# Patient Record
Sex: FEMALE | Race: WHITE | NOT HISPANIC OR LATINO | URBAN - METROPOLITAN AREA
[De-identification: names, ages, dates, MRNs, and addresses within clinical notes are randomized per-mention and may not be internally consistent; named-entity substitution may affect disease eponyms.]

---

## 2018-06-21 ENCOUNTER — EMERGENCY (EMERGENCY)
Facility: HOSPITAL | Age: 33
LOS: 1 days | Discharge: HOME | End: 2018-06-21
Attending: EMERGENCY MEDICINE | Admitting: EMERGENCY MEDICINE
Payer: MEDICAID

## 2018-06-21 VITALS — DIASTOLIC BLOOD PRESSURE: 87 MMHG | SYSTOLIC BLOOD PRESSURE: 125 MMHG | HEART RATE: 80 BPM

## 2018-06-21 VITALS
TEMPERATURE: 98 F | OXYGEN SATURATION: 99 % | RESPIRATION RATE: 17 BRPM | HEART RATE: 102 BPM | SYSTOLIC BLOOD PRESSURE: 131 MMHG | DIASTOLIC BLOOD PRESSURE: 95 MMHG

## 2018-06-21 LAB
ALBUMIN SERPL ELPH-MCNC: 3 G/DL — LOW (ref 3.4–5)
ALP SERPL-CCNC: 73 U/L — SIGNIFICANT CHANGE UP (ref 40–120)
ALT FLD-CCNC: 14 U/L — SIGNIFICANT CHANGE UP (ref 12–42)
ANION GAP SERPL CALC-SCNC: 10 MMOL/L — SIGNIFICANT CHANGE UP (ref 9–16)
APPEARANCE UR: CLEAR — SIGNIFICANT CHANGE UP
APTT BLD: 25.1 SEC — LOW (ref 27.5–36.5)
AST SERPL-CCNC: 15 U/L — SIGNIFICANT CHANGE UP (ref 15–37)
BILIRUB SERPL-MCNC: 0.3 MG/DL — SIGNIFICANT CHANGE UP (ref 0.2–1.2)
BILIRUB UR-MCNC: NEGATIVE — SIGNIFICANT CHANGE UP
BUN SERPL-MCNC: 10 MG/DL — SIGNIFICANT CHANGE UP (ref 7–23)
CALCIUM SERPL-MCNC: 9 MG/DL — SIGNIFICANT CHANGE UP (ref 8.5–10.5)
CHLORIDE SERPL-SCNC: 103 MMOL/L — SIGNIFICANT CHANGE UP (ref 96–108)
CK MB BLD-MCNC: <0.75 % — SIGNIFICANT CHANGE UP
CK MB CFR SERPL CALC: <0.5 NG/ML — LOW (ref 0.5–3.6)
CK SERPL-CCNC: 67 U/L — SIGNIFICANT CHANGE UP (ref 26–192)
CO2 SERPL-SCNC: 24 MMOL/L — SIGNIFICANT CHANGE UP (ref 22–31)
COLOR SPEC: YELLOW — SIGNIFICANT CHANGE UP
CREAT SERPL-MCNC: 0.82 MG/DL — SIGNIFICANT CHANGE UP (ref 0.5–1.3)
D DIMER BLD IA.RAPID-MCNC: 185 NG/ML DDU — SIGNIFICANT CHANGE UP
DIFF PNL FLD: NEGATIVE — SIGNIFICANT CHANGE UP
GLUCOSE SERPL-MCNC: 99 MG/DL — SIGNIFICANT CHANGE UP (ref 70–99)
GLUCOSE UR QL: NEGATIVE — SIGNIFICANT CHANGE UP
HCG UR QL: NEGATIVE — SIGNIFICANT CHANGE UP
HCT VFR BLD CALC: 38 % — SIGNIFICANT CHANGE UP (ref 34.5–45)
HGB BLD-MCNC: 12.3 G/DL — SIGNIFICANT CHANGE UP (ref 11.5–15.5)
INR BLD: 0.89 — SIGNIFICANT CHANGE UP (ref 0.88–1.16)
KETONES UR-MCNC: NEGATIVE — SIGNIFICANT CHANGE UP
LEUKOCYTE ESTERASE UR-ACNC: NEGATIVE — SIGNIFICANT CHANGE UP
MCHC RBC-ENTMCNC: 27.2 PG — SIGNIFICANT CHANGE UP (ref 27–34)
MCHC RBC-ENTMCNC: 32.4 G/DL — SIGNIFICANT CHANGE UP (ref 32–36)
MCV RBC AUTO: 83.9 FL — SIGNIFICANT CHANGE UP (ref 80–100)
NITRITE UR-MCNC: NEGATIVE — SIGNIFICANT CHANGE UP
NT-PROBNP SERPL-SCNC: 14 PG/ML — SIGNIFICANT CHANGE UP
PH UR: 6 — SIGNIFICANT CHANGE UP (ref 5–8)
PLATELET # BLD AUTO: 405 K/UL — HIGH (ref 150–400)
POTASSIUM SERPL-MCNC: 4.4 MMOL/L — SIGNIFICANT CHANGE UP (ref 3.5–5.3)
POTASSIUM SERPL-SCNC: 4.4 MMOL/L — SIGNIFICANT CHANGE UP (ref 3.5–5.3)
PROT SERPL-MCNC: 7.9 G/DL — SIGNIFICANT CHANGE UP (ref 6.4–8.2)
PROT UR-MCNC: NEGATIVE MG/DL — SIGNIFICANT CHANGE UP
PROTHROM AB SERPL-ACNC: 9.8 SEC — SIGNIFICANT CHANGE UP (ref 9.8–12.7)
RBC # BLD: 4.53 M/UL — SIGNIFICANT CHANGE UP (ref 3.8–5.2)
RBC # FLD: 12.2 % — SIGNIFICANT CHANGE UP (ref 10.3–16.9)
SODIUM SERPL-SCNC: 137 MMOL/L — SIGNIFICANT CHANGE UP (ref 132–145)
SP GR SPEC: <=1.005 — SIGNIFICANT CHANGE UP (ref 1–1.03)
TROPONIN I SERPL-MCNC: <0.017 NG/ML — LOW (ref 0.02–0.06)
UROBILINOGEN FLD QL: 0.2 E.U./DL — SIGNIFICANT CHANGE UP
WBC # BLD: 10 K/UL — SIGNIFICANT CHANGE UP (ref 3.8–10.5)
WBC # FLD AUTO: 10 K/UL — SIGNIFICANT CHANGE UP (ref 3.8–10.5)

## 2018-06-21 PROCEDURE — 71275 CT ANGIOGRAPHY CHEST: CPT | Mod: 26

## 2018-06-21 PROCEDURE — 70450 CT HEAD/BRAIN W/O DYE: CPT | Mod: 26

## 2018-06-21 PROCEDURE — 93010 ELECTROCARDIOGRAM REPORT: CPT

## 2018-06-21 PROCEDURE — 99284 EMERGENCY DEPT VISIT MOD MDM: CPT | Mod: 25

## 2018-06-21 RX ORDER — SODIUM CHLORIDE 9 MG/ML
1000 INJECTION INTRAMUSCULAR; INTRAVENOUS; SUBCUTANEOUS ONCE
Qty: 0 | Refills: 0 | Status: COMPLETED | OUTPATIENT
Start: 2018-06-21 | End: 2018-06-21

## 2018-06-21 RX ADMIN — SODIUM CHLORIDE 1000 MILLILITER(S): 9 INJECTION INTRAMUSCULAR; INTRAVENOUS; SUBCUTANEOUS at 14:03

## 2018-06-21 NOTE — ED PROVIDER NOTE - OBJECTIVE STATEMENT
34 yo F with no PMHx presents s/p syncope episode while on airplane 8 hours ago. Pt states does not usually suffer from altitude sickness, but went on a trip going from [place] to [place]. Denies fever, chills, or other complaints.

## 2018-06-21 NOTE — ED PROVIDER NOTE - MEDICAL DECISION MAKING DETAILS
Pt presents s/p syncope episode likely due to altitude sickness. Will conduct labs, CT angio and head, and EKG and reassess.

## 2018-06-21 NOTE — ED ADULT NURSE NOTE - OBJECTIVE STATEMENT
Patient to ED s/p syncopal episode while on flight when plane dropped quickly.  Patient in n oacute distress

## 2018-06-25 DIAGNOSIS — R55 SYNCOPE AND COLLAPSE: ICD-10-CM

## 2018-06-25 DIAGNOSIS — Z88.1 ALLERGY STATUS TO OTHER ANTIBIOTIC AGENTS STATUS: ICD-10-CM

## 2018-06-28 ENCOUNTER — APPOINTMENT (OUTPATIENT)
Dept: FAMILY MEDICINE | Facility: CLINIC | Age: 33
End: 2018-06-28
Payer: MEDICAID

## 2018-06-28 VITALS
OXYGEN SATURATION: 97 % | WEIGHT: 163 LBS | DIASTOLIC BLOOD PRESSURE: 79 MMHG | HEART RATE: 82 BPM | TEMPERATURE: 97.9 F | BODY MASS INDEX: 27.16 KG/M2 | SYSTOLIC BLOOD PRESSURE: 115 MMHG | HEIGHT: 65 IN

## 2018-06-28 DIAGNOSIS — Z13.29 ENCOUNTER FOR SCREENING FOR OTHER SUSPECTED ENDOCRINE DISORDER: ICD-10-CM

## 2018-06-28 DIAGNOSIS — Z13.1 ENCOUNTER FOR SCREENING FOR DIABETES MELLITUS: ICD-10-CM

## 2018-06-28 DIAGNOSIS — Z84.2 FAMILY HISTORY OF OTHER DISEASES OF THE GENITOURINARY SYSTEM: ICD-10-CM

## 2018-06-28 DIAGNOSIS — H93.8X9 OTHER SPECIFIED DISORDERS OF EAR, UNSPECIFIED EAR: ICD-10-CM

## 2018-06-28 DIAGNOSIS — Z13.21 ENCOUNTER FOR SCREENING FOR NUTRITIONAL DISORDER: ICD-10-CM

## 2018-06-28 DIAGNOSIS — Z82.49 FAMILY HISTORY OF ISCHEMIC HEART DISEASE AND OTHER DISEASES OF THE CIRCULATORY SYSTEM: ICD-10-CM

## 2018-06-28 PROCEDURE — 99385 PREV VISIT NEW AGE 18-39: CPT | Mod: 25

## 2018-06-28 PROCEDURE — 99214 OFFICE O/P EST MOD 30 MIN: CPT | Mod: 25

## 2018-06-28 PROCEDURE — 36415 COLL VENOUS BLD VENIPUNCTURE: CPT

## 2018-06-28 NOTE — PHYSICAL EXAM
[No Acute Distress] : no acute distress [Well Nourished] : well nourished [Well Developed] : well developed [Well-Appearing] : well-appearing [Normal Sclera/Conjunctiva] : normal sclera/conjunctiva [PERRL] : pupils equal round and reactive to light [EOMI] : extraocular movements intact [Normal Outer Ear/Nose] : the outer ears and nose were normal in appearance [Normal Oropharynx] : the oropharynx was normal [No JVD] : no jugular venous distention [Supple] : supple [No Lymphadenopathy] : no lymphadenopathy [Thyroid Normal, No Nodules] : the thyroid was normal and there were no nodules present [No Respiratory Distress] : no respiratory distress  [Clear to Auscultation] : lungs were clear to auscultation bilaterally [No Accessory Muscle Use] : no accessory muscle use [Normal Rate] : normal rate  [Regular Rhythm] : with a regular rhythm [Normal S1, S2] : normal S1 and S2 [No Murmur] : no murmur heard [No Abdominal Bruit] : a ~M bruit was not heard ~T in the abdomen [No Varicosities] : no varicosities [No Edema] : there was no peripheral edema [No Extremity Clubbing/Cyanosis] : no extremity clubbing/cyanosis [No Palpable Aorta] : no palpable aorta [Soft] : abdomen soft [Non Tender] : non-tender [Non-distended] : non-distended [No Masses] : no abdominal mass palpated [No HSM] : no HSM [Normal Bowel Sounds] : normal bowel sounds [Normal Posterior Cervical Nodes] : no posterior cervical lymphadenopathy [Normal Anterior Cervical Nodes] : no anterior cervical lymphadenopathy [No CVA Tenderness] : no CVA  tenderness [No Spinal Tenderness] : no spinal tenderness [No Joint Swelling] : no joint swelling [Grossly Normal Strength/Tone] : grossly normal strength/tone [No Rash] : no rash [Normal Gait] : normal gait [Coordination Grossly Intact] : coordination grossly intact [No Focal Deficits] : no focal deficits [Normal Affect] : the affect was normal [Normal Insight/Judgement] : insight and judgment were intact [Deep Tendon Reflexes (DTR)] : deep tendon reflexes were 2+ and symmetric [Cranial Nerves Oculomotor (III)] : the extraocular motions were intact [Cranial Nerves Trigeminal (V)] : sensation to the face and masseter strength were intact [Cranial Nerves Facial (VII)] : facial strength was intact bilaterally [Cranial Nerves Vestibulocochlear (VIII)] : hearing was intact [Cranial Nerves Hypoglossal (XII)] : there was no tongue deviation with protrusion [Normal] : coordination was normal [Limited Balance] : balance was intact [Romberg's Sign] : Romberg's sign was negtive [2+] : left 2+ [Alert and Oriented x3] : oriented to person, place, and time [de-identified] : fluid behind tms

## 2018-06-29 LAB
25(OH)D3 SERPL-MCNC: 39.6 NG/ML
ALBUMIN SERPL ELPH-MCNC: 3.7 G/DL
ALP BLD-CCNC: 59 U/L
ALT SERPL-CCNC: 4 U/L
ANION GAP SERPL CALC-SCNC: 17 MMOL/L
APPEARANCE: CLEAR
AST SERPL-CCNC: 17 U/L
BACTERIA: ABNORMAL
BASOPHILS # BLD AUTO: 0.05 K/UL
BASOPHILS NFR BLD AUTO: 0.6 %
BILIRUB SERPL-MCNC: 0.3 MG/DL
BILIRUBIN URINE: NEGATIVE
BLOOD URINE: NEGATIVE
BUN SERPL-MCNC: 12 MG/DL
CALCIUM SERPL-MCNC: 9 MG/DL
CHLORIDE SERPL-SCNC: 100 MMOL/L
CHOLEST SERPL-MCNC: 234 MG/DL
CHOLEST/HDLC SERPL: 3 RATIO
CO2 SERPL-SCNC: 19 MMOL/L
COLOR: YELLOW
CREAT SERPL-MCNC: 0.79 MG/DL
EOSINOPHIL # BLD AUTO: 0.18 K/UL
EOSINOPHIL NFR BLD AUTO: 2.1 %
FOLATE SERPL-MCNC: 14.7 NG/ML
GLUCOSE QUALITATIVE U: NEGATIVE MG/DL
GLUCOSE SERPL-MCNC: 86 MG/DL
HBA1C MFR BLD HPLC: 5.6 %
HCT VFR BLD CALC: 38.6 %
HDLC SERPL-MCNC: 78 MG/DL
HGB BLD-MCNC: 11.5 G/DL
HYALINE CASTS: 0 /LPF
IMM GRANULOCYTES NFR BLD AUTO: 0 %
KETONES URINE: NEGATIVE
LDLC SERPL CALC-MCNC: 113 MG/DL
LEUKOCYTE ESTERASE URINE: NEGATIVE
LYMPHOCYTES # BLD AUTO: 3.18 K/UL
LYMPHOCYTES NFR BLD AUTO: 36.8 %
MAN DIFF?: NORMAL
MCHC RBC-ENTMCNC: 26.9 PG
MCHC RBC-ENTMCNC: 29.8 GM/DL
MCV RBC AUTO: 90.4 FL
MICROSCOPIC-UA: NORMAL
MONOCYTES # BLD AUTO: 0.39 K/UL
MONOCYTES NFR BLD AUTO: 4.5 %
NEUTROPHILS # BLD AUTO: 4.84 K/UL
NEUTROPHILS NFR BLD AUTO: 56 %
NITRITE URINE: NEGATIVE
PH URINE: 6.5
PLATELET # BLD AUTO: 412 K/UL
POTASSIUM SERPL-SCNC: 4.2 MMOL/L
PROT SERPL-MCNC: 7.5 G/DL
PROTEIN URINE: NEGATIVE MG/DL
RBC # BLD: 4.27 M/UL
RBC # FLD: 14 %
RED BLOOD CELLS URINE: 0 /HPF
SODIUM SERPL-SCNC: 136 MMOL/L
SPECIFIC GRAVITY URINE: 1
SQUAMOUS EPITHELIAL CELLS: 1 /HPF
T4 FREE SERPL-MCNC: 1.3 NG/DL
TRIGL SERPL-MCNC: 214 MG/DL
TSH SERPL-ACNC: 1.31 UIU/ML
UROBILINOGEN URINE: NEGATIVE MG/DL
VIT B12 SERPL-MCNC: 475 PG/ML
WBC # FLD AUTO: 8.64 K/UL
WHITE BLOOD CELLS URINE: 1 /HPF

## 2019-03-08 ENCOUNTER — APPOINTMENT (OUTPATIENT)
Dept: FAMILY MEDICINE | Facility: CLINIC | Age: 34
End: 2019-03-08
Payer: MEDICAID

## 2019-03-08 ENCOUNTER — LABORATORY RESULT (OUTPATIENT)
Age: 34
End: 2019-03-08

## 2019-03-08 VITALS
HEIGHT: 65 IN | HEART RATE: 95 BPM | SYSTOLIC BLOOD PRESSURE: 116 MMHG | OXYGEN SATURATION: 98 % | DIASTOLIC BLOOD PRESSURE: 81 MMHG | TEMPERATURE: 98.7 F | WEIGHT: 171 LBS | BODY MASS INDEX: 28.49 KG/M2

## 2019-03-08 PROCEDURE — 36415 COLL VENOUS BLD VENIPUNCTURE: CPT

## 2019-03-08 PROCEDURE — 99214 OFFICE O/P EST MOD 30 MIN: CPT | Mod: 25

## 2019-03-08 NOTE — PHYSICAL EXAM
[No Acute Distress] : no acute distress [Well Nourished] : well nourished [Normal Sclera/Conjunctiva] : normal sclera/conjunctiva [PERRL] : pupils equal round and reactive to light [EOMI] : extraocular movements intact [Normal Outer Ear/Nose] : the outer ears and nose were normal in appearance [Supple] : supple [No Respiratory Distress] : no respiratory distress  [Clear to Auscultation] : lungs were clear to auscultation bilaterally [Normal Rate] : normal rate  [Regular Rhythm] : with a regular rhythm [No Edema] : there was no peripheral edema [No CVA Tenderness] : no CVA  tenderness [No Spinal Tenderness] : no spinal tenderness [No Joint Swelling] : no joint swelling [Grossly Normal Strength/Tone] : grossly normal strength/tone [No Rash] : no rash [Normal Gait] : normal gait [Coordination Grossly Intact] : coordination grossly intact [No Focal Deficits] : no focal deficits [Deep Tendon Reflexes (DTR)] : deep tendon reflexes were 2+ and symmetric [Speech Grossly Normal] : speech grossly normal [Memory Grossly Normal] : memory grossly normal [Normal Affect] : the affect was normal [Alert and Oriented x3] : oriented to person, place, and time [Normal Insight/Judgement] : insight and judgment were intact

## 2019-03-08 NOTE — REVIEW OF SYSTEMS
[Fatigue] : fatigue [Nausea] : nausea [Dizziness] : dizziness [Unsteady Walking] : ataxia [Negative] : Heme/Lymph [de-identified] : syncope

## 2019-03-08 NOTE — HISTORY OF PRESENT ILLNESS
[FreeTextEntry8] : 33 yo female here for follow up. Back in June had episode where she was on a plane and she felt nauseous and sick. She got cold and sweaty and her knees went numb. She had tunnel vision and passed out for a few seconds. She went to the ED for this after landing in [place]. They did EKG, labs, D dimer, CT brain, and CTA and all were negative.  Symptoms started back in June. Had 4 similar episodes since then with dizziness. The initial episode resulted in her passing out, but no passing out since then. Feeling better today, but still struggling with feeling unstable at times when walking. Still with a complaint of nausea at times, extreme fatigue, and feeling like her legs are very heavy. Symptoms occur generally when she is very stressed out. Feels like she's dragging her feet. Also admitting to random joint pain. Last episode was last week before a stressful deadline and her shoulder was hurting her a lot at that time. No longer with shoulder pain.\par \par Admitting to daily anxiety. Seeing a psychoanalyst, just saw him this tuesday. Hadn't seen him in a while before.

## 2019-03-13 ENCOUNTER — MOBILE ON CALL (OUTPATIENT)
Age: 34
End: 2019-03-13

## 2019-03-13 LAB
25(OH)D3 SERPL-MCNC: 33.8 NG/ML
ALBUMIN SERPL ELPH-MCNC: 4.1 G/DL
ALP BLD-CCNC: 70 U/L
ALT SERPL-CCNC: 9 U/L
ANA SER IF-ACNC: NEGATIVE
ANION GAP SERPL CALC-SCNC: 17 MMOL/L
AST SERPL-CCNC: 12 U/L
B BURGDOR IGG+IGM SER QL IB: NORMAL
BASOPHILS # BLD AUTO: 0.05 K/UL
BASOPHILS NFR BLD AUTO: 0.6 %
BILIRUB SERPL-MCNC: <0.2 MG/DL
BUN SERPL-MCNC: 14 MG/DL
CALCIUM SERPL-MCNC: 9.5 MG/DL
CCP AB SER IA-ACNC: 10 UNITS
CHLORIDE SERPL-SCNC: 104 MMOL/L
CO2 SERPL-SCNC: 19 MMOL/L
CREAT SERPL-MCNC: 0.74 MG/DL
CRP SERPL-MCNC: 1.1 MG/DL
EOSINOPHIL # BLD AUTO: 0.12 K/UL
EOSINOPHIL NFR BLD AUTO: 1.3 %
ERYTHROCYTE [SEDIMENTATION RATE] IN BLOOD BY WESTERGREN METHOD: 34 MM/HR
FOLATE SERPL-MCNC: 19.1 NG/ML
GLUCOSE SERPL-MCNC: 100 MG/DL
HBA1C MFR BLD HPLC: 5.5 %
HCG SERPL-MCNC: <1 MIU/ML
HCT VFR BLD CALC: 38.2 %
HGB BLD-MCNC: 11.7 G/DL
IMM GRANULOCYTES NFR BLD AUTO: 0.1 %
LYMPHOCYTES # BLD AUTO: 4.15 K/UL
LYMPHOCYTES NFR BLD AUTO: 46.7 %
MAN DIFF?: NORMAL
MCHC RBC-ENTMCNC: 27.9 PG
MCHC RBC-ENTMCNC: 30.6 GM/DL
MCV RBC AUTO: 91 FL
MONOCYTES # BLD AUTO: 0.49 K/UL
MONOCYTES NFR BLD AUTO: 5.5 %
NEUTROPHILS # BLD AUTO: 4.07 K/UL
NEUTROPHILS NFR BLD AUTO: 45.8 %
PLATELET # BLD AUTO: 436 K/UL
POTASSIUM SERPL-SCNC: 4.9 MMOL/L
PROT SERPL-MCNC: 7.4 G/DL
RBC # BLD: 4.2 M/UL
RBC # FLD: 13.1 %
RF+CCP IGG SER-IMP: NEGATIVE
RHEUMATOID FACT SER QL: <10 IU/ML
SODIUM SERPL-SCNC: 140 MMOL/L
T4 FREE SERPL-MCNC: 1.3 NG/DL
TSH SERPL-ACNC: 0.87 UIU/ML
VIT B12 SERPL-MCNC: 553 PG/ML
WBC # FLD AUTO: 8.89 K/UL

## 2019-03-14 LAB

## 2019-03-18 ENCOUNTER — TRANSCRIPTION ENCOUNTER (OUTPATIENT)
Age: 34
End: 2019-03-18

## 2019-04-01 ENCOUNTER — APPOINTMENT (OUTPATIENT)
Dept: RHEUMATOLOGY | Facility: CLINIC | Age: 34
End: 2019-04-01
Payer: MEDICAID

## 2019-04-01 VITALS
DIASTOLIC BLOOD PRESSURE: 78 MMHG | HEIGHT: 65 IN | WEIGHT: 171 LBS | SYSTOLIC BLOOD PRESSURE: 113 MMHG | BODY MASS INDEX: 28.49 KG/M2 | HEART RATE: 83 BPM | TEMPERATURE: 98.5 F | OXYGEN SATURATION: 98 %

## 2019-04-01 DIAGNOSIS — Z82.49 FAMILY HISTORY OF ISCHEMIC HEART DISEASE AND OTHER DISEASES OF THE CIRCULATORY SYSTEM: ICD-10-CM

## 2019-04-01 DIAGNOSIS — Z80.9 FAMILY HISTORY OF MALIGNANT NEOPLASM, UNSPECIFIED: ICD-10-CM

## 2019-04-01 DIAGNOSIS — R53.1 WEAKNESS: ICD-10-CM

## 2019-04-01 DIAGNOSIS — Z84.2 FAMILY HISTORY OF OTHER DISEASES OF THE GENITOURINARY SYSTEM: ICD-10-CM

## 2019-04-01 DIAGNOSIS — R59.0 LOCALIZED ENLARGED LYMPH NODES: ICD-10-CM

## 2019-04-01 PROCEDURE — 81025 URINE PREGNANCY TEST: CPT

## 2019-04-01 PROCEDURE — 36415 COLL VENOUS BLD VENIPUNCTURE: CPT

## 2019-04-01 PROCEDURE — 99205 OFFICE O/P NEW HI 60 MIN: CPT | Mod: 25

## 2019-04-01 RX ORDER — FLUTICASONE PROPIONATE 50 UG/1
50 SPRAY, METERED NASAL TWICE DAILY
Qty: 1 | Refills: 0 | Status: COMPLETED | COMMUNITY
Start: 2018-06-28 | End: 2019-04-01

## 2019-04-01 NOTE — ASSESSMENT
[FreeTextEntry1] : 35 yo female was referred by PMD Dr. Garcia for Rheum evaluation, pt has anxiety, fatigue and dizziness, symptoms started back  in June. \par Most recently she feel depressed, has insomnia and itchiness, also  reports arthralgia, puffiness of her hands, dry eyes without dry mouth. \par Exam is noted for left shoulder tenderness on palpation laterally , right tender wrist and overall puffy hands with no synovitis of any joints on exam. \par small R submandibular and left inguinal LAD.\par Labs noted lymphocytosis and thrombocytosis. Rheum work up has been negative, except mildly elevated ESR and CRP. \par Pending Neurology and Hematology consultations. \par \par Differential diagnosis of her presentation is most likely related to depression and she need a formal psychiatric evaluation which I discussed with pt. \par Will r/u Sjogren's disease given above symptoms + LAD on exam, however negative SWAPNIL argues against Sjogren's: Check SSa/Ssb. Puffy hands mostly due to fat accumulation for recent weight gain. \par repeat ESR and CRP. \par Check CK, aldolase: not high  suspicious for inflammatory myositis given not typical distribution of weakness \par For b/l hand arthralgia and puffiness: will obtain b/l hand and wrist X-rays. ( office urine pregnancy test negative)  \par Will check SPEP/UPEP with immunofixation and encouraged pt to make Hem appointment as she has LAD, itchiness along with lymphocytosis  and lymphoproliferative disease need to be rule out.   \par Check for Hep B, C, HIV and Tb quantiferon. \par WIll see her in 2 weeks for follow up labs and images.

## 2019-04-01 NOTE — REVIEW OF SYSTEMS
[Feeling Tired] : feeling tired [Recent Weight Gain (___ Lbs)] : recent [unfilled] ~Ulb weight gain [Dry Eyes] : dryness of the eyes [Arthralgias] : arthralgias [Joint Pain] : joint pain [Joint Swelling] : joint swelling [Itching] : itching [Dizziness] : dizziness [Limb Weakness] : limb weakness [Sleep Disturbances] : sleep disturbances [Anxiety] : anxiety [Depression] : depression [Swollen Glands] : swollen glands [Fever] : no fever [Feeling Poorly] : not feeling poorly [Eye Pain] : no eye pain [Red Eyes] : eyes not red [Eyesight Problems] : no eyesight problems [Discharge From Eyes] : no purulent discharge from the eyes [Eyes Itch] : no itching of the eyes [Nosebleeds] : no nosebleeds [Heart Rate Is Slow] : the heart rate was not slow [Heart Rate Is Fast] : the heart rate was not fast [Chest Pain] : no chest pain [Palpitations] : no palpitations [Leg Claudication] : no intermittent leg claudication [Lower Ext Edema] : no lower extremity edema [Shortness Of Breath] : no shortness of breath [Wheezing] : no wheezing [Cough] : no cough [SOB on Exertion] : no shortness of breath during exertion [Orthopnea] : no orthopnea [Abdominal Pain] : no abdominal pain [Vomiting] : no vomiting [Constipation] : no constipation [Diarrhea] : no diarrhea [Heartburn] : no heartburn [Melena] : no melena [Dysuria] : no dysuria [Pelvic Pain] : no pelvic pain [Vaginal Discharge] : no vaginal discharge [Joint Stiffness] : no joint stiffness [Limb Pain] : no limb pain [Limb Swelling] : no limb swelling [Skin Lesions] : no skin lesions [Skin Wound] : no skin wound [Confused] : no confusion [Convulsions] : no convulsions [Difficulty Walking] : no difficulty walking [Suicidal] : not suicidal [Change In Personality] : no personality change [Emotional Problems] : no emotional problems [Proptosis] : no proptosis [Hot Flashes] : no hot flashes [Muscle Weakness] : no muscle weakness [Easy Bleeding] : no tendency for easy bleeding [Easy Bruising] : no tendency for easy bruising [Swollen Glands In The Neck] : no swollen glands in the neck [FreeTextEntry4] : left ear ringing occasionally  [FreeTextEntry7] : nausea  [de-identified] : hopeless thoughts.  [de-identified] : left groin

## 2019-04-01 NOTE — PHYSICAL EXAM
[General Appearance - Alert] : alert [General Appearance - In No Acute Distress] : in no acute distress [General Appearance - Well Nourished] : well nourished [General Appearance - Well Developed] : well developed [Sclera] : the sclera and conjunctiva were normal [Optic Disc Abnormality] : the optic disc were normal in size and color [Hearing Threshold Finger Rub Not [place]] : hearing was normal [Examination Of The Oral Cavity] : the lips and gums were normal [Oropharynx] : the oropharynx was normal [Neck Appearance] : the appearance of the neck was normal [Thyroid Diffuse Enlargement] : the thyroid was not enlarged [Thyroid Nodule] : there were no palpable thyroid nodules [Auscultation Breath Sounds / Voice Sounds] : lungs were clear to auscultation bilaterally [Heart Rate And Rhythm] : heart rate was normal and rhythm regular [Heart Sounds] : normal S1 and S2 [Heart Sounds Gallop] : no gallops [Murmurs] : no murmurs [Heart Sounds Pericardial Friction Rub] : no pericardial rub [Arterial Pulses Femoral] : femoral pulses were normal without bruits [Edema] : there was no peripheral edema [Veins - Varicosity Changes] : there were no varicosital changes [Abdomen Soft] : soft [Abdomen Tenderness] : non-tender [Abdomen Mass (___ Cm)] : no abdominal mass palpated [Inguinal Lymph Nodes Enlarged Bilaterally] : inguinal [Abnormal Walk] : normal gait [Nail Clubbing] : no clubbing  or cyanosis of the fingernails [Musculoskeletal - Swelling] : no joint swelling seen [Motor Tone] : muscle strength and tone were normal [Skin Color & Pigmentation] : normal skin color and pigmentation [] : no rash [Skin Lesions] : no skin lesions [Sensation] : the sensory exam was normal to light touch and pinprick [Motor Exam] : the motor exam was normal [Oriented To Time, Place, And Person] : oriented to person, place, and time [FreeTextEntry1] : After exam, pt noted few areas of hyperpigmented spots on both knees. not raised, non tender

## 2019-04-01 NOTE — HISTORY OF PRESENT ILLNESS
[Fatigue] : fatigue [Depression] : depression [Arthralgias] : arthralgias [Joint Swelling] : joint swelling [Difficulty Standing] : difficulty standing [Difficulty Walking] : difficulty walking [Dry Eyes] : dry eyes [FreeTextEntry1] : 33 yo female was referred by PMD Dr. Garcia for Rheum evaluation, pt has anxiety, fatigue and dizziness, symptoms started back  in June was on a plane and she felt nauseous and sick, felt  cold and sweaty and her knees went numb. She had tunnel vision and passed out for a few seconds. She went to the ED for this after landing in [place], work up has been negative. Had recurrent 4 similar episodes since then with dizziness,  no passing out since then.\par ROS: +  extreme fatigue, arthralgia and puffiness of her hands without dry skin, Raynaud;s disease or rash, but do reports full body itchiness recently. Can't sleep well. \par Dry eyes but no dry mouth, able to produce tears. \par She feels depressed since before a stressful deadline and she could not focus or function at all. \par Admitting to daily anxiety. Seeing a psychoanalyst weekly, but not seen psychiatrist. \par 20Ibs weight gain for 1 year.  \par Nausea \par Denies alopecia or malar rash.  \par Last week she is experiencing left sided should pain, thinks due to sleeping in wrong position\par R knee snapping sounds and sometimes ankles , knees swells up if kneels \par Has herniated L spine disc L1-L3 but denies chronic low back pain. \par Left groin small mobile Lymph node. 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 \par Denies personal or family hx of autoimmune disease. \par She had extensive labs done recently which showed mildly elevated ESR and CRP, negative RF.CCP, SWAPNIL. \par CBC with lymphocytosis and thrombocytosis: pending Hem consult.  [Anorexia] : no anorexia [Weight Loss] : no weight loss [Malaise] : no malaise [Fever] : no fever [Chills] : no chills [Malar Facial Rash] : no malar facial rash [Skin Lesions] : no lesions [Skin Nodules] : no skin nodules [Oral Ulcers] : no oral ulcers [Cough] : no cough [Dry Mouth] : no dry mouth [Dysphonia] : no dysphonia [Dysphagia] : no dysphagia [Joint Warmth] : no joint warmth [Joint Deformity] : no joint deformity [Decreased ROM] : no decreased range of motion [Morning Stiffness] : no morning stiffness [Falls] : no falls [Muscle Weakness] : no muscle weakness [Muscle Spasms] : no muscle spasms [Muscle Cramping] : no muscle cramping [Visual Changes] : no visual changes [Eye Pain] : no eye pain [Eye Redness] : no eye redness

## 2019-04-02 ENCOUNTER — LABORATORY RESULT (OUTPATIENT)
Age: 34
End: 2019-04-02

## 2019-04-02 ENCOUNTER — OUTPATIENT (OUTPATIENT)
Dept: OUTPATIENT SERVICES | Facility: HOSPITAL | Age: 34
LOS: 1 days | End: 2019-04-02

## 2019-04-02 ENCOUNTER — APPOINTMENT (OUTPATIENT)
Dept: RADIOLOGY | Facility: CLINIC | Age: 34
End: 2019-04-02
Payer: MEDICAID

## 2019-04-02 PROCEDURE — 73110 X-RAY EXAM OF WRIST: CPT | Mod: 26,50

## 2019-04-02 PROCEDURE — 73130 X-RAY EXAM OF HAND: CPT | Mod: 26,50

## 2019-04-03 LAB
CK SERPL-CCNC: 68 U/L
CRP SERPL-MCNC: 1.25 MG/DL
ERYTHROCYTE [SEDIMENTATION RATE] IN BLOOD BY WESTERGREN METHOD: 42 MM/HR
HBV CORE IGM SER QL: NONREACTIVE
HBV SURFACE AG SER QL: NONREACTIVE
HCV AB SER QL: NONREACTIVE
HCV S/CO RATIO: 0.11 S/CO
HIV1+2 AB SPEC QL IA.RAPID: NONREACTIVE

## 2019-04-04 LAB
ALBUMIN MFR SERPL ELPH: 52.2 %
ALBUMIN SERPL-MCNC: 3.7 G/DL
ALBUMIN/GLOB SERPL: 1.1 RATIO
ALDOLASE SERPL-CCNC: 2.2 U/L
ALPHA1 GLOB MFR SERPL ELPH: 5.2 %
ALPHA1 GLOB SERPL ELPH-MCNC: 0.4 G/DL
ALPHA2 GLOB MFR SERPL ELPH: 11.1 %
ALPHA2 GLOB SERPL ELPH-MCNC: 0.8 G/DL
B-GLOBULIN MFR SERPL ELPH: 14.5 %
B-GLOBULIN SERPL ELPH-MCNC: 1 G/DL
ENA SS-A AB SER IA-ACNC: <0.2 AL
ENA SS-B AB SER IA-ACNC: <0.2 AL
GAMMA GLOB FLD ELPH-MCNC: 1.2 G/DL
GAMMA GLOB MFR SERPL ELPH: 17 %
HBV SURFACE AB SER QL: NONREACTIVE
INTERPRETATION SERPL IEP-IMP: NORMAL
M PROTEIN SPEC IFE-MCNC: NORMAL
PROT SERPL-MCNC: 7.1 G/DL
PROT SERPL-MCNC: 7.1 G/DL

## 2019-04-06 LAB
ALBUPE: 7.8 %
ALPHA1UPE: 26.4 %
ALPHA2UPE: 14.8 %
BETAUPE: 7.9 %
GAMMAUPE: 43.1 %
IGA 24H UR QL IFE: NORMAL
KAPPA LC 24H UR QL: NORMAL
M TB IFN-G BLD-IMP: NEGATIVE
PROT PATTERN 24H UR ELPH-IMP: NORMAL
PROT UR-MCNC: <4 MG/DL
PROT UR-MCNC: <4 MG/DL
QUANTIFERON TB PLUS MITOGEN MINUS NIL: 1.65 IU/ML
QUANTIFERON TB PLUS NIL: 0.02 IU/ML
QUANTIFERON TB PLUS TB1 MINUS NIL: 0 IU/ML
QUANTIFERON TB PLUS TB2 MINUS NIL: 0 IU/ML

## 2019-04-18 ENCOUNTER — APPOINTMENT (OUTPATIENT)
Dept: HEMATOLOGY ONCOLOGY | Facility: CLINIC | Age: 34
End: 2019-04-18
Payer: MEDICAID

## 2019-04-18 VITALS
TEMPERATURE: 98.4 F | HEART RATE: 78 BPM | OXYGEN SATURATION: 98 % | DIASTOLIC BLOOD PRESSURE: 83 MMHG | HEIGHT: 66 IN | SYSTOLIC BLOOD PRESSURE: 117 MMHG | RESPIRATION RATE: 14 BRPM | BODY MASS INDEX: 27.97 KG/M2 | WEIGHT: 174 LBS

## 2019-04-18 DIAGNOSIS — D47.3 ESSENTIAL (HEMORRHAGIC) THROMBOCYTHEMIA: ICD-10-CM

## 2019-04-18 PROCEDURE — 99204 OFFICE O/P NEW MOD 45 MIN: CPT | Mod: 25

## 2019-04-18 PROCEDURE — 36415 COLL VENOUS BLD VENIPUNCTURE: CPT

## 2019-04-19 ENCOUNTER — LABORATORY RESULT (OUTPATIENT)
Age: 34
End: 2019-04-19

## 2019-04-23 ENCOUNTER — TRANSCRIPTION ENCOUNTER (OUTPATIENT)
Age: 34
End: 2019-04-23

## 2019-04-23 ENCOUNTER — APPOINTMENT (OUTPATIENT)
Dept: RHEUMATOLOGY | Facility: CLINIC | Age: 34
End: 2019-04-23
Payer: MEDICAID

## 2019-04-23 VITALS
BODY MASS INDEX: 27.97 KG/M2 | HEIGHT: 66 IN | SYSTOLIC BLOOD PRESSURE: 121 MMHG | OXYGEN SATURATION: 96 % | TEMPERATURE: 99.3 F | HEART RATE: 73 BPM | WEIGHT: 174 LBS | DIASTOLIC BLOOD PRESSURE: 83 MMHG

## 2019-04-23 DIAGNOSIS — Z86.2 PERSONAL HISTORY OF DISEASES OF THE BLOOD AND BLOOD-FORMING ORGANS AND CERTAIN DISORDERS INVOLVING THE IMMUNE MECHANISM: ICD-10-CM

## 2019-04-23 LAB
BASOPHILS NFR BLD AUTO: 0.4 %
EOSINOPHIL NFR BLD AUTO: 1.4 %
HCT VFR BLD CALC: 38.9 %
HGB BLD-MCNC: 12.6 G/DL
LYMPHOCYTES NFR BLD AUTO: 43.3 %
MAN DIFF?: NORMAL
MCHC RBC-ENTMCNC: 28.4 PG
MCHC RBC-ENTMCNC: 32.4 GM/DL
MCV RBC AUTO: 87.6 FL
MONOCYTES NFR BLD AUTO: 6.1 %
NEUTROPHILS NFR BLD AUTO: 48.5 %
PLATELET # BLD AUTO: 387 K/UL
RBC # BLD: 4.44 M/UL
RBC # FLD: 12.1 %
WBC # FLD AUTO: 9.39 K/UL

## 2019-04-23 PROCEDURE — 99214 OFFICE O/P EST MOD 30 MIN: CPT | Mod: 25

## 2019-04-23 PROCEDURE — 36415 COLL VENOUS BLD VENIPUNCTURE: CPT

## 2019-04-23 RX ORDER — PSYLLIUM HUSK 0.4 G
CAPSULE ORAL
Refills: 0 | Status: DISCONTINUED | COMMUNITY
End: 2019-04-23

## 2019-04-23 NOTE — HISTORY OF PRESENT ILLNESS
[de-identified] : 34 years old female who admits she is under tremendous amount of stress was found to have elevated platelet count... of note the platelet count is minimally elevated.... workup so far was all negative... patient has been seeing a psychiatrist for the last 2-1/2 years...

## 2019-04-23 NOTE — ASSESSMENT
[FreeTextEntry1] : 35 yo female was referred by PMD Dr. Garcia for Rheum evaluation, pt has anxiety, fatigue and dizziness, symptoms started back  in June, 2018\par Most recently she feel depressed, has insomnia and itchiness, also  reports arthralgia, puffiness of her hands, dry eyes without dry mouth. \par Exam is noted for left shoulder tenderness on palpation laterally , right tender wrist and overall puffy hands with no synovitis of any joints on exam. \par small R submandibular and left inguinal LAD.\par Labs noted lymphocytosis and thrombocytosis. Rheum work up has been negative, except moderately elevated ESR and CRP. \par \par \par Differential diagnosis of her presentation is most likely related to depression and she need a formal psychiatric evaluation which I discussed with pt again today. \par Will r/u Sjogren's disease given above symptoms + LAD on exam, however negative SWAPNIL argues against Sjogren's: Check SSa/Ssb. Puffy hands mostly due to fat accumulation for recent weight gain. \par repeat ESR and CRP  moderately elevated \par Normal CK, aldolase\par negative SPEP/UPEP with IF. Had hem consultation: manual diff no lymphocytosis  no thrombocytosis. \par \par For submandibular LAD: will obtain neck US and refer pt to ENT ( to see PAIGE Marrero After US) as may need LN excisional biopsy based on US finding. \par differential diagnoses of LAD  reactive vs inflammatory ( Sjogrens vs sarcoidosis) vs lymphoproliferative. \par Pt has few pulmonary solid nodules found incidentally on CT performed 06/2017, she is due for f/u CT in May/June and will ask Dr. Garcia to order it. \par Check ACE level and ANCA \par Has intermittent urticaria and pruritus: will refer for Allergy/Immunology evaluation. \par \par f/u in 3 weeks

## 2019-04-23 NOTE — CONSULT LETTER
[Dear  ___] : Dear  [unfilled], [Consult Letter:] : I had the pleasure of evaluating your patient, [unfilled]. [Please see my note below.] : Please see my note below. [Consult Closing:] : Thank you very much for allowing me to participate in the care of this patient.  If you have any questions, please do not hesitate to contact me. [Sincerely,] : Sincerely, [FreeTextEntry3] : Florencia Pollard MD\par

## 2019-04-23 NOTE — ASSESSMENT
[FreeTextEntry1] : Repeat blood work Platelet count is within normal limits...\par \par Patient is hematologically stable followup here p.r.n.

## 2019-04-23 NOTE — HISTORY OF PRESENT ILLNESS
[Fatigue] : fatigue [Depression] : depression [Arthralgias] : arthralgias [Joint Swelling] : joint swelling [Difficulty Standing] : difficulty standing [Difficulty Walking] : difficulty walking [Dry Eyes] : dry eyes [FreeTextEntry1] : Follow up: 4/23/19\par Pt saw Hem: manual diff normal, repeat CBC no thrombocytosis\par No new complaints, labs negative, except moderatly elevated ESR and CRP. \par Still with pruritus and intermittent urticaria, takes zyrtec to control symptoms. \par Denies fever, chills or cough.  \par Has not seen psychiatrist yet, very stressed out about many things, argues with BF. \par \par \par Initial consult: 4/1/19\par 35 yo female was referred by PMD Dr. Garcia for Rheum evaluation, pt has anxiety, fatigue and dizziness, symptoms started back  in June was on a plane and she felt nauseous and sick, felt  cold and sweaty and her knees went numb. She had tunnel vision and passed out for a few seconds. She went to the ED for this after landing in [place], work up has been negative. Had recurrent 4 similar episodes since then with dizziness,  no passing out since then.\par ROS: +  extreme fatigue, arthralgia and puffiness of her hands without dry skin, Raynaud;s disease or rash, but do reports full body itchiness recently. Can't sleep well. \par Dry eyes but no dry mouth, able to produce tears. \par She feels depressed since before a stressful deadline and she could not focus or function at all. \par Admitting to daily anxiety. Seeing a psychoanalyst weekly, but not seen psychiatrist. \par 20Ibs weight gain for 1 year.  \par Nausea \par Denies alopecia or malar rash.  \par Last week she is experiencing left sided should pain, thinks due to sleeping in wrong position\par R knee snapping sounds and sometimes ankles , knees swells up if kneels \par Has herniated L spine disc L1-L3 but denies chronic low back pain. \par Left groin small mobile Lymph node. 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 \par Denies personal or family hx of autoimmune disease. \par She had extensive labs done recently which showed mildly elevated ESR and CRP, negative RF.CCP, SWAPNIL. \par CBC with lymphocytosis and thrombocytosis: pending Hem consult.  [Anorexia] : no anorexia [Weight Loss] : no weight loss [Malaise] : no malaise [Fever] : no fever [Chills] : no chills [Malar Facial Rash] : no malar facial rash [Skin Lesions] : no lesions [Skin Nodules] : no skin nodules [Oral Ulcers] : no oral ulcers [Cough] : no cough [Dry Mouth] : no dry mouth [Dysphonia] : no dysphonia [Dysphagia] : no dysphagia [Joint Warmth] : no joint warmth [Joint Deformity] : no joint deformity [Decreased ROM] : no decreased range of motion [Falls] : no falls [Morning Stiffness] : no morning stiffness [Muscle Weakness] : no muscle weakness [Muscle Spasms] : no muscle spasms [Muscle Cramping] : no muscle cramping [Eye Pain] : no eye pain [Visual Changes] : no visual changes [Eye Redness] : no eye redness

## 2019-04-23 NOTE — REVIEW OF SYSTEMS
[Feeling Tired] : feeling tired [Recent Weight Gain (___ Lbs)] : recent [unfilled] ~Ulb weight gain [Dry Eyes] : dryness of the eyes [Arthralgias] : arthralgias [Joint Pain] : joint pain [Joint Swelling] : joint swelling [Itching] : itching [Dizziness] : dizziness [Limb Weakness] : limb weakness [Sleep Disturbances] : sleep disturbances [Anxiety] : anxiety [Depression] : depression [Swollen Glands] : swollen glands [Fever] : no fever [Eye Pain] : no eye pain [Feeling Poorly] : not feeling poorly [Eyesight Problems] : no eyesight problems [Red Eyes] : eyes not red [Eyes Itch] : no itching of the eyes [Discharge From Eyes] : no purulent discharge from the eyes [Heart Rate Is Slow] : the heart rate was not slow [Nosebleeds] : no nosebleeds [Heart Rate Is Fast] : the heart rate was not fast [Palpitations] : no palpitations [Chest Pain] : no chest pain [Leg Claudication] : no intermittent leg claudication [Lower Ext Edema] : no lower extremity edema [Shortness Of Breath] : no shortness of breath [Wheezing] : no wheezing [Cough] : no cough [SOB on Exertion] : no shortness of breath during exertion [Vomiting] : no vomiting [Abdominal Pain] : no abdominal pain [Orthopnea] : no orthopnea [Constipation] : no constipation [Diarrhea] : no diarrhea [Heartburn] : no heartburn [Dysuria] : no dysuria [Melena] : no melena [Vaginal Discharge] : no vaginal discharge [Pelvic Pain] : no pelvic pain [Joint Stiffness] : no joint stiffness [Limb Pain] : no limb pain [Skin Lesions] : no skin lesions [Limb Swelling] : no limb swelling [Convulsions] : no convulsions [Confused] : no confusion [Skin Wound] : no skin wound [Difficulty Walking] : no difficulty walking [Suicidal] : not suicidal [Change In Personality] : no personality change [Emotional Problems] : no emotional problems [Proptosis] : no proptosis [Hot Flashes] : no hot flashes [Muscle Weakness] : no muscle weakness [Easy Bleeding] : no tendency for easy bleeding [Easy Bruising] : no tendency for easy bruising [Swollen Glands In The Neck] : no swollen glands in the neck [FreeTextEntry4] : left ear ringing occasionally  [FreeTextEntry7] : nausea  [de-identified] : hopeless thoughts.  [de-identified] : left groin

## 2019-04-23 NOTE — PHYSICAL EXAM
[General Appearance - Alert] : alert [General Appearance - In No Acute Distress] : in no acute distress [General Appearance - Well Developed] : well developed [General Appearance - Well Nourished] : well nourished [Sclera] : the sclera and conjunctiva were normal [Optic Disc Abnormality] : the optic disc were normal in size and color [Hearing Threshold Finger Rub Not [place]] : hearing was normal [Examination Of The Oral Cavity] : the lips and gums were normal [Oropharynx] : the oropharynx was normal [Neck Appearance] : the appearance of the neck was normal [Thyroid Diffuse Enlargement] : the thyroid was not enlarged [Thyroid Nodule] : there were no palpable thyroid nodules [Auscultation Breath Sounds / Voice Sounds] : lungs were clear to auscultation bilaterally [Heart Rate And Rhythm] : heart rate was normal and rhythm regular [Heart Sounds] : normal S1 and S2 [Heart Sounds Gallop] : no gallops [Murmurs] : no murmurs [Heart Sounds Pericardial Friction Rub] : no pericardial rub [Arterial Pulses Femoral] : femoral pulses were normal without bruits [Edema] : there was no peripheral edema [Veins - Varicosity Changes] : there were no varicosital changes [Abdomen Soft] : soft [Abdomen Tenderness] : non-tender [Abdomen Mass (___ Cm)] : no abdominal mass palpated [Inguinal Lymph Nodes Enlarged Bilaterally] : inguinal [Nail Clubbing] : no clubbing  or cyanosis of the fingernails [Abnormal Walk] : normal gait [Motor Tone] : muscle strength and tone were normal [Musculoskeletal - Swelling] : no joint swelling seen [Skin Color & Pigmentation] : normal skin color and pigmentation [] : no rash [Skin Lesions] : no skin lesions [Sensation] : the sensory exam was normal to light touch and pinprick [Motor Exam] : the motor exam was normal [Oriented To Time, Place, And Person] : oriented to person, place, and time [FreeTextEntry1] : After exam, pt noted few areas of hyperpigmented spots on both knees. not raised, non tender

## 2019-04-26 ENCOUNTER — MOBILE ON CALL (OUTPATIENT)
Age: 34
End: 2019-04-26

## 2019-05-01 LAB
MISCELLANEOUS TEST: NORMAL
PROC NAME: NORMAL

## 2019-05-17 ENCOUNTER — FORM ENCOUNTER (OUTPATIENT)
Age: 34
End: 2019-05-17

## 2019-05-18 ENCOUNTER — APPOINTMENT (OUTPATIENT)
Dept: ULTRASOUND IMAGING | Facility: CLINIC | Age: 34
End: 2019-05-18
Payer: MEDICAID

## 2019-05-18 ENCOUNTER — OUTPATIENT (OUTPATIENT)
Dept: OUTPATIENT SERVICES | Facility: HOSPITAL | Age: 34
LOS: 1 days | End: 2019-05-18

## 2019-05-18 ENCOUNTER — APPOINTMENT (OUTPATIENT)
Dept: CT IMAGING | Facility: CLINIC | Age: 34
End: 2019-05-18
Payer: MEDICAID

## 2019-05-18 PROCEDURE — 71250 CT THORAX DX C-: CPT | Mod: 26

## 2019-05-18 PROCEDURE — 76536 US EXAM OF HEAD AND NECK: CPT | Mod: 26

## 2019-05-20 ENCOUNTER — MOBILE ON CALL (OUTPATIENT)
Age: 34
End: 2019-05-20

## 2019-05-20 ENCOUNTER — TRANSCRIPTION ENCOUNTER (OUTPATIENT)
Age: 34
End: 2019-05-20

## 2019-05-21 LAB
ACE BLD-CCNC: 45 U/L
ANCA AB SER-IMP: NEGATIVE
C-ANCA SER-ACNC: NEGATIVE
CRP SERPL-MCNC: 1.46 MG/DL
ERYTHROCYTE [SEDIMENTATION RATE] IN BLOOD BY WESTERGREN METHOD: 54 MM/HR
MPO AB + PR3 PNL SER: NORMAL
P-ANCA TITR SER IF: NEGATIVE

## 2019-05-23 ENCOUNTER — APPOINTMENT (OUTPATIENT)
Dept: OTOLARYNGOLOGY | Facility: CLINIC | Age: 34
End: 2019-05-23
Payer: MEDICAID

## 2019-05-23 VITALS
TEMPERATURE: 98.4 F | OXYGEN SATURATION: 98 % | HEART RATE: 100 BPM | HEIGHT: 66 IN | BODY MASS INDEX: 27.97 KG/M2 | DIASTOLIC BLOOD PRESSURE: 72 MMHG | SYSTOLIC BLOOD PRESSURE: 119 MMHG | WEIGHT: 174 LBS

## 2019-05-23 DIAGNOSIS — Z78.9 OTHER SPECIFIED HEALTH STATUS: ICD-10-CM

## 2019-05-23 DIAGNOSIS — J35.1 HYPERTROPHY OF TONSILS: ICD-10-CM

## 2019-05-23 DIAGNOSIS — R59.1 GENERALIZED ENLARGED LYMPH NODES: ICD-10-CM

## 2019-05-23 DIAGNOSIS — R06.83 SNORING: ICD-10-CM

## 2019-05-23 DIAGNOSIS — Z81.1 FAMILY HISTORY OF ALCOHOL ABUSE AND DEPENDENCE: ICD-10-CM

## 2019-05-23 PROCEDURE — 99204 OFFICE O/P NEW MOD 45 MIN: CPT | Mod: 25

## 2019-05-23 PROCEDURE — 92550 TYMPANOMETRY & REFLEX THRESH: CPT

## 2019-05-23 PROCEDURE — 31575 DIAGNOSTIC LARYNGOSCOPY: CPT

## 2019-05-23 PROCEDURE — 92504 EAR MICROSCOPY EXAMINATION: CPT

## 2019-05-23 PROCEDURE — 99203 OFFICE O/P NEW LOW 30 MIN: CPT | Mod: 25

## 2019-05-23 PROCEDURE — 92557 COMPREHENSIVE HEARING TEST: CPT

## 2019-05-23 NOTE — PHYSICAL EXAM
[de-identified] : No LAD noted [Binocular Microscopic Exam] : Binocular microscopic exam was performed [Laryngoscopy Performed] : laryngoscopy was performed, see procedure section for findings [de-identified] : 3-4+ [Normal] : no rashes

## 2019-05-23 NOTE — CONSULT LETTER
[Dear  ___] : Dear  [unfilled], [Consult Letter:] : I had the pleasure of evaluating your patient, [unfilled]. [Please see my note below.] : Please see my note below. [Consult Closing:] : Thank you very much for allowing me to participate in the care of this patient.  If you have any questions, please do not hesitate to contact me. [Sincerely,] : Sincerely, [FreeTextEntry3] : ROBY Marrero Jr, MD, FAAOHNS\par Otolaryngologist\par [facility]

## 2019-05-23 NOTE — HISTORY OF PRESENT ILLNESS
[de-identified] : R sided lymphadenopathy found on an exam around 6 wks ago and found on sono to represent a large level 2 LN. Exhausted for the last year & undergoing an extensive rheumatologic workup which has so far been negative except for inflammatory markers. No recent URIs or other head & neck complaints, but she had a dental cleaning. Denies type B sxs. Complains of unexpected weight gain over several years. \par Also with L ear "fuzziness" kervin in loud areas; also nonpulsatile R>L tinnitus. \par Snoring but no witnessed apneas; not treated for HTN. No morning headaches but a lot of daytime somnolence.

## 2019-05-23 NOTE — ASSESSMENT
[FreeTextEntry1] : Somnolence with snoring and TH but no witnessed apneas: Discussed how to have a family member perform a home apnea screen; bring this information upon return along with cell phone video if unsure.\par LAD no longer palpable on exam without other concerns; in the absence of other issues, would follow this, but will d/w Dr. Tuttle regarding the need for a tissue dx. \par Rpt thyroid sono 1 yr. \par Reassured regarding her ears.

## 2019-05-23 NOTE — DATA REVIEWED
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 Condition: improving. Plan: Discussed diagnosis in detail with patient. Discussed treatment options with patient. Continue using current medication(s). Will continue to observe condition and or symptoms. [de-identified] : today: normal [de-identified] : 5/19: ESR 54, CRP 1.46; nl ANCAs, ACE, CBC, HIV [de-identified] : 5/19 neck: R thyroid lobe 1 cm nodule w/o susp features; 2.4 x 1.1 x 1.7cm R level 2 LN w/ a fatty hilum

## 2019-06-05 ENCOUNTER — APPOINTMENT (OUTPATIENT)
Dept: PULMONOLOGY | Facility: CLINIC | Age: 34
End: 2019-06-05
Payer: MEDICAID

## 2019-06-05 VITALS
TEMPERATURE: 98.5 F | HEART RATE: 97 BPM | SYSTOLIC BLOOD PRESSURE: 116 MMHG | BODY MASS INDEX: 27.97 KG/M2 | WEIGHT: 174 LBS | HEIGHT: 66 IN | OXYGEN SATURATION: 97 % | DIASTOLIC BLOOD PRESSURE: 72 MMHG

## 2019-06-05 PROCEDURE — 99204 OFFICE O/P NEW MOD 45 MIN: CPT

## 2019-06-05 NOTE — PHYSICAL EXAM
[General Appearance - Well Developed] : well developed [Normal Appearance] : normal appearance [General Appearance - Well Nourished] : well nourished [Normal Conjunctiva] : the conjunctiva exhibited no abnormalities [No Deformities] : no deformities [Erythema] : erythema of the pharynx [Neck Appearance] : the appearance of the neck was normal [Heart Rate And Rhythm] : heart rate and rhythm were normal [] : no respiratory distress [Heart Sounds] : normal S1 and S2 [Auscultation Breath Sounds / Voice Sounds] : lungs were clear to auscultation bilaterally [Respiration, Rhythm And Depth] : normal respiratory rhythm and effort [Exaggerated Use Of Accessory Muscles For Inspiration] : no accessory muscle use [Bowel Sounds] : normal bowel sounds [Abdomen Soft] : soft [Abnormal Walk] : normal gait [Nail Clubbing] : no clubbing of the fingernails [No Focal Deficits] : no focal deficits [Cyanosis, Localized] : no localized cyanosis [Impaired Insight] : insight and judgment were intact [Oriented To Time, Place, And Person] : oriented to person, place, and time [Affect] : the affect was normal [Mood] : the mood was normal

## 2019-06-05 NOTE — ASSESSMENT
[FreeTextEntry1] : 33 yo woman with hx of recurrent syncopal episodes and fatigue referred for evaluation of pulmonary nodules. Referred by Dr. Trey Tuttle. Recent CT chest reviewed: new LLL pleural based solid nodule of 3 mm, unchanged bilateral solid 4 mm nodules (CTA done in 2018); no hilar/mediastinal lymphadenopathy noted but this evaluation is limited without contrast. Ms. Sellers is a never smoker and is otherwise low risk and as per most recent Fleischer guidelines follow up is not indicated. CARY calculator predicts <1% chance of malignancy at 2 and 4 year follow up. Also, there is no reason to suspect that these nodules are in any way related to a possible rheumatologic disorder that is undergoing workup currently. \par \par Given fatigue, snoring, excessive daytime somnolence and weight gain Ms. Sellers is indicated for sleep apnea testing. We discussed both home and attended testing. Will proceed with an attended PSG. If insurance denies coverage HSAT testing is acceptable. She was referred to the [facility] for the PSG. The ramifications of ADDISON and its treatment were discussed in detail. \par \par Ms. Sellers will follow up in 4 weeks after the PSG is resulted.

## 2019-06-05 NOTE — HISTORY OF PRESENT ILLNESS
[Difficulty Breathing During Exertion] : denies dyspnea on exertion [Feelings Of Weakness On Exertion] : denies exercise intolerance [Cough] : denies coughing [Wheezing] : denies wheezing [Regional Soft Tissue Swelling Both Lower Extremities] : denies lower extremity edema [Chest Pain Or Discomfort] : denies chest pain [Fever] : denies fever [0  -  Nothing at all] : 0, nothing at all [Class I - No Symptoms and No Limitations] : I [Snoring] : snoring [Daytime Somnolence] : daytime somnolence [ESS: ___] : ESS score [unfilled] [Awakes Unrefreshed] : awakening unrefreshed [Recent  Weight Gain] : recent weight gain [Oxygen] : the patient uses no supplemental oxygen [DIS] : no DIS [Unusual Sleep Behavior] : no unusual sleep behavior [DMS] : no DMS [Hypersomnolence] : no hypersomnolence [Cataplexy] : no cataplexy [Sleep Paralysis] : no sleep paralysis [Hypnagogic Hallucinations] : no hallucinations when falling asleep [Hypnopompic Hallucinations] : no hallucinations when awakening [FreeTextEntry1] : 35 yo woman with hx of 4 fainting episodes in last 1 year presenting for evaluation of pulmonary nodules. Ms. Sellers had 4 syncopal episodes of unclear etiology. Endorses daytime fatigue and excessive daytime somnolence. Has gained weight. Rheum work up was initiated and she has an elevated ESR/CRP. CT chest done in May 2019 (as part of syncopal workup) showed pulmonary nodules. She denies all pulmonary and respiratory symptoms. Did have frequent bronchitis when she was younger. No cough. Never smoker. No occupational exposures. No known mold exposures. No cats.\par \par Of note, right cervical node >2cm was noted (along with complex thyroid cyst); followed by Dr. Marrero for further diagnosis. \par

## 2019-06-05 NOTE — CONSULT LETTER
[Dear  ___] : Dear  [unfilled], [Consult Letter:] : I had the pleasure of evaluating your patient, [unfilled]. [Please see my note below.] : Please see my note below. [Consult Closing:] : Thank you very much for allowing me to participate in the care of this patient.  If you have any questions, please do not hesitate to contact me. [FreeTextEntry3] : Annie Rodriguez MD\par \par [place] [employer] [facility]\par Pulmonary, Critical Care, and Sleep Medicine\par  [Sincerely,] : Sincerely,

## 2019-06-06 ENCOUNTER — OTHER (OUTPATIENT)
Age: 34
End: 2019-06-06

## 2019-06-11 ENCOUNTER — OTHER (OUTPATIENT)
Age: 34
End: 2019-06-11

## 2019-07-09 ENCOUNTER — APPOINTMENT (OUTPATIENT)
Dept: NEUROLOGY | Facility: CLINIC | Age: 34
End: 2019-07-09
Payer: MEDICAID

## 2019-07-09 DIAGNOSIS — R42 DIZZINESS AND GIDDINESS: ICD-10-CM

## 2019-07-09 DIAGNOSIS — R55 SYNCOPE AND COLLAPSE: ICD-10-CM

## 2019-07-09 PROCEDURE — 99205 OFFICE O/P NEW HI 60 MIN: CPT

## 2019-07-09 NOTE — DISCUSSION/SUMMARY
[FreeTextEntry1] : Lightheadedness/few episodes of syncope, for which I recommended autonomic testing.  Episodes of generalized fatigue triggered by stress.\par \par Low suspicion for primary neurological issues\par Counceled on staying well hydrated, not avoiding meals, etc. to help prevent episodes \par \par F/U 2 months

## 2019-07-09 NOTE — PHYSICAL EXAM
[FreeTextEntry1] : Exam:\par Gen- awake, alert, NAD\par MS- oriented x3, able to follow commands, speech fluent and nondysarthric\par CN- PERRL, EOMI without nystag, face symmetrical, v1-3 intact, tongue and uvula midline, shoulder shrug intact\par M- R,L (5,5)\par UE: Shoulder abduction (5,5)\par Elbow flexion (5,5)\par Elbow extension (5,5)\par Wrist flexion (5,5)\par Wrist extension (5,5)\par  (5,5)\par no PND\par LE:\par Hip flexion (5,5)\par Knee flexion (5,5)\par Knee extension (5,5)\par Dorsiflexion (5,5)\par Reflexes: R, L (2+, 2+)\par Biceps: (2+, 2+)\par Triceps (2+, 2+)\par BR (2+, 2+)\par Patellars (2+, 2+)\par AJ (2+, 2+)\par Toes: downgoing bilat\par Sensory:\par LT, pinprick, vibration, proprioception intact bilat\par Coord- FTN intact bilat\par Gait- narrow based, steady gait, able to tandem, heel and toe walk\par \par \par

## 2019-07-09 NOTE — HISTORY OF PRESENT ILLNESS
[FreeTextEntry1] : 34 year old F presents with intermittent episodes of whole body fatigue, difficulty walking for at least a year. \par \par 1 year ago fainted on an flight.  Reports being asleep, woke up nauseous, was able to et up and walk to the bathroom but while waiting for the bathroom developed cold sweats/tunnel vision/lightheadedness and fainted.  Didnt hit her head, friend caught her.  After which she felt very tired and had hard time walking, feeling as if she had to work to lift legs off the ground.  When she returned to [place] she went  urgent care, where they told her it was stress related.  however for the following week she felt extremely fatigued.  3 weeks later in [place], had a near fainting spell right before an award ceremony. 3 more times over the next year she had episodes of extreme fatigue and lightheadedness, all 3 prior to a deadline or work trip in which she was very stressed. \par \par Last episode of extreme fatigue was at the end of Feb 2019, she spoke to Dr. Garcia who advised her to see Neurology. Reports since that she has been okay, but on some days feels generalized fatigue and difficulty walking (describes who body stiffness).  \par \par also had 1 other fain[place] subway.  Many near fainting spels of feeling lightheaded, only when standing.  Gets lightheaded and feels momentarily off balance when she rises to a standing position.  \par \par No HA, N/V, no weakness, numbness or balance issues otherwise.  No constipation.  Has occasional diarrhea but attributes to eating cheese bec shes lactose intolerance.  Hasnt identified any sexual issues.  \par \par Workup revealed ESR/CRP elev. otherwise autoimmune bloodwork and hematological workup nl.  Found to have pulm nodules, saw pulm who felt they were benign.  Sleep study was recommended for sleep apnea.  also had some cervical lymphadenopathy, rheum wanted a biopsy but ENT did not feel this was necessary.  \par \par Felt depressed back ~ March in the setting of severe stress but that improved. \par \par PMHx\par none\par \par Home Meds\par OCP\par B12 complex\par vit D\par \par Allergies\par amoxicillin \par \par Social Hx\par no cigs\par social alc\par no drugs\par works as a book writer and  \par \par FHx\par mother- endometriosis\par father- MI\par \par \par ROS- negative except as listed in HPI\par \par

## 2019-07-23 ENCOUNTER — OTHER (OUTPATIENT)
Age: 34
End: 2019-07-23

## 2019-08-29 ENCOUNTER — APPOINTMENT (OUTPATIENT)
Dept: RHEUMATOLOGY | Facility: CLINIC | Age: 34
End: 2019-08-29
Payer: MEDICAID

## 2019-08-29 VITALS
TEMPERATURE: 98.1 F | WEIGHT: 165.56 LBS | BODY MASS INDEX: 26.61 KG/M2 | HEIGHT: 66 IN | DIASTOLIC BLOOD PRESSURE: 84 MMHG | HEART RATE: 92 BPM | SYSTOLIC BLOOD PRESSURE: 123 MMHG | OXYGEN SATURATION: 97 %

## 2019-08-29 DIAGNOSIS — H04.123 DRY EYE SYNDROME OF BILATERAL LACRIMAL GLANDS: ICD-10-CM

## 2019-08-29 PROCEDURE — 99214 OFFICE O/P EST MOD 30 MIN: CPT | Mod: 25

## 2019-08-29 PROCEDURE — 36415 COLL VENOUS BLD VENIPUNCTURE: CPT

## 2019-08-29 NOTE — ASSESSMENT
[FreeTextEntry1] : 35 yo female with stress induced  fatigue flares and dizziness,  has insomnia and recommended sleep studies which she has not done yet, and itchiness, also  reports arthralgia, puffiness of her hands, dry eyes without dry mouth. SHe has elevated ESR and CRP with normal other extensive Rheum labs and normal b/l hand and wrist Xray. \par Pulmonary nodules,   Dr. Rodriguez who felt they were benign. \par Pt continues same arthralgia and exam  right tender wrist and overall puffy R hand with no synovitis of any joints on exam. Negative tests for CTS. \par \par Differential diagnosis of her presentation is most likely related to depression and she need a formal psychiatric evaluation which I discussed with pt again today. \par Will send early Sjogren's serology, IgG4 level. \par repeat ESR and CRP\par b/l hand and wrist US to rule out underlying active synovitis or tenosynovitis or CTS ( however how low suspicious) \par Referral to [facility] given for possible EMG/NCS \par Recommended to schedule sleep studies. \par \par Previously reported  intermittent urticaria and pruritus: not an active issue today, not seen Allergy/Immunology. \par \par 1.0 cm complex left thyroid cyst. Follow-up thyroid ultrasound in 12 months is recommended. \par \par \par f/u in 3 weeks

## 2019-08-29 NOTE — PHYSICAL EXAM
[General Appearance - Alert] : alert [General Appearance - In No Acute Distress] : in no acute distress [General Appearance - Well Nourished] : well nourished [General Appearance - Well Developed] : well developed [Sclera] : the sclera and conjunctiva were normal [Optic Disc Abnormality] : the optic disc were normal in size and color [Hearing Threshold Finger Rub Not [place]] : hearing was normal [Examination Of The Oral Cavity] : the lips and gums were normal [Neck Appearance] : the appearance of the neck was normal [Oropharynx] : the oropharynx was normal [Thyroid Nodule] : there were no palpable thyroid nodules [Thyroid Diffuse Enlargement] : the thyroid was not enlarged [Auscultation Breath Sounds / Voice Sounds] : lungs were clear to auscultation bilaterally [Heart Sounds] : normal S1 and S2 [Heart Rate And Rhythm] : heart rate was normal and rhythm regular [Heart Sounds Gallop] : no gallops [Murmurs] : no murmurs [Heart Sounds Pericardial Friction Rub] : no pericardial rub [Edema] : there was no peripheral edema [Veins - Varicosity Changes] : there were no varicosital changes [Inguinal Lymph Nodes Enlarged Bilaterally] : inguinal [Abnormal Walk] : normal gait [Musculoskeletal - Swelling] : no joint swelling seen [Nail Clubbing] : no clubbing  or cyanosis of the fingernails [Motor Tone] : muscle strength and tone were normal [] : no rash [Skin Color & Pigmentation] : normal skin color and pigmentation [Skin Lesions] : no skin lesions [Oriented To Time, Place, And Person] : oriented to person, place, and time [FreeTextEntry1] : right tender wrist and overall puffy R hand, Tinel and Phalen has been negative

## 2019-08-29 NOTE — HISTORY OF PRESENT ILLNESS
[Fatigue] : fatigue [Depression] : depression [Joint Swelling] : joint swelling [Arthralgias] : arthralgias [Difficulty Standing] : difficulty standing [Difficulty Walking] : difficulty walking [Dry Eyes] : dry eyes [FreeTextEntry1] : Follow up: 8/29/19\par Elevated inflammatory markers both ESR and CRP with no underlying autoimmune disease. \par Found to have pulm nodules, saw pulm  Dr. Rodriguez who felt they were benign. Sleep study was recommended for sleep apnea, for  cervical lymphadenopathy no LAD on palpation to biopsy according to Dr. Marrero. \par Saw Neurology recommended autonomic testing. \par Pt continues to experiencing episodic flares of the fatigue mostly stress related, insomnia. \par With fatigue flares feels more body stiffness, has R wrist and thumb pain, worse during typing, intermittent tingling and numbness sensation of the thumb, ankle pain and left hip states unchanged. \par \par Follow up: 4/23/19\par Pt saw Hem: manual diff normal, repeat CBC no thrombocytosis\par No new complaints, labs negative, except moderately elevated ESR and CRP. \par Still with pruritus and intermittent urticaria, takes zyrtec to control symptoms. \par Denies fever, chills or cough.  \par Has not seen psychiatrist yet, very stressed out about many things, argues with BF. \par \par \par Initial consult: 4/1/19\par 35 yo female was referred by PMD Dr. Garcia for Rheum evaluation, pt has anxiety, fatigue and dizziness, symptoms started back  in June was on a plane and she felt nauseous and sick, felt  cold and sweaty and her knees went numb. She had tunnel vision and passed out for a few seconds. She went to the ED for this after landing in [place], work up has been negative. Had recurrent 4 similar episodes since then with dizziness,  no passing out since then.\par ROS: +  extreme fatigue, arthralgia and puffiness of her hands without dry skin, Raynaud;s disease or rash, but do reports full body itchiness recently. Can't sleep well. \par Dry eyes but no dry mouth, able to produce tears. \par She feels depressed since before a stressful deadline and she could not focus or function at all. \par Admitting to daily anxiety. Seeing a psychoanalyst weekly, but not seen psychiatrist. \par 20Ibs weight gain for 1 year.  \par Nausea \par Denies alopecia or malar rash.  \par Last week she is experiencing left sided should pain, thinks due to sleeping in wrong position\par R knee snapping sounds and sometimes ankles , knees swells up if kneels \par Has herniated L spine disc L1-L3 but denies chronic low back pain. \par Left groin small mobile Lymph node. 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 \par Denies personal or family hx of autoimmune disease. \par She had extensive labs done recently which showed mildly elevated ESR and CRP, negative RF.CCP, SWAPNIL. \par CBC with lymphocytosis and thrombocytosis: pending Hem consult.  [Anorexia] : no anorexia [Weight Loss] : no weight loss [Malaise] : no malaise [Fever] : no fever [Chills] : no chills [Malar Facial Rash] : no malar facial rash [Skin Lesions] : no lesions [Oral Ulcers] : no oral ulcers [Skin Nodules] : no skin nodules [Dry Mouth] : no dry mouth [Cough] : no cough [Dysphagia] : no dysphagia [Dysphonia] : no dysphonia [Joint Warmth] : no joint warmth [Joint Deformity] : no joint deformity [Decreased ROM] : no decreased range of motion [Morning Stiffness] : no morning stiffness [Falls] : no falls [Muscle Weakness] : no muscle weakness [Muscle Spasms] : no muscle spasms [Visual Changes] : no visual changes [Muscle Cramping] : no muscle cramping [Eye Pain] : no eye pain [Eye Redness] : no eye redness

## 2019-08-29 NOTE — REVIEW OF SYSTEMS
[Feeling Tired] : feeling tired [Recent Weight Gain (___ Lbs)] : recent [unfilled] ~Ulb weight gain [Dry Eyes] : dryness of the eyes [Joint Pain] : joint pain [Arthralgias] : arthralgias [Joint Swelling] : joint swelling [Itching] : itching [Limb Weakness] : limb weakness [Dizziness] : dizziness [Sleep Disturbances] : sleep disturbances [Anxiety] : anxiety [Depression] : depression [Swollen Glands] : swollen glands [Fever] : no fever [Feeling Poorly] : not feeling poorly [Eye Pain] : no eye pain [Red Eyes] : eyes not red [Eyesight Problems] : no eyesight problems [Discharge From Eyes] : no purulent discharge from the eyes [Eyes Itch] : no itching of the eyes [Nosebleeds] : no nosebleeds [Heart Rate Is Slow] : the heart rate was not slow [Chest Pain] : no chest pain [Heart Rate Is Fast] : the heart rate was not fast [Palpitations] : no palpitations [Leg Claudication] : no intermittent leg claudication [Shortness Of Breath] : no shortness of breath [Lower Ext Edema] : no lower extremity edema [Cough] : no cough [Wheezing] : no wheezing [Orthopnea] : no orthopnea [SOB on Exertion] : no shortness of breath during exertion [Abdominal Pain] : no abdominal pain [Constipation] : no constipation [Vomiting] : no vomiting [Diarrhea] : no diarrhea [Heartburn] : no heartburn [Melena] : no melena [Dysuria] : no dysuria [Pelvic Pain] : no pelvic pain [Vaginal Discharge] : no vaginal discharge [Joint Stiffness] : no joint stiffness [Limb Swelling] : no limb swelling [Limb Pain] : no limb pain [Skin Lesions] : no skin lesions [Skin Wound] : no skin wound [Confused] : no confusion [Convulsions] : no convulsions [Difficulty Walking] : no difficulty walking [Suicidal] : not suicidal [Change In Personality] : no personality change [Proptosis] : no proptosis [Emotional Problems] : no emotional problems [Hot Flashes] : no hot flashes [Muscle Weakness] : no muscle weakness [Easy Bleeding] : no tendency for easy bleeding [Easy Bruising] : no tendency for easy bruising [Swollen Glands In The Neck] : no swollen glands in the neck [FreeTextEntry4] : left ear ringing occasionally  [de-identified] : hopeless thoughts.  [FreeTextEntry7] : nausea  [de-identified] : left groin

## 2019-09-09 ENCOUNTER — TRANSCRIPTION ENCOUNTER (OUTPATIENT)
Age: 34
End: 2019-09-09

## 2019-09-09 LAB
CRP SERPL-MCNC: 1.35 MG/DL
DEPRECATED KAPPA LC FREE/LAMBDA SER: 0.85 RATIO
ERYTHROCYTE [SEDIMENTATION RATE] IN BLOOD BY WESTERGREN METHOD: 50 MM/HR
IGA SER QL IEP: 200 MG/DL
IGG SER QL IEP: 1227 MG/DL
IGG4 SER-MCNC: 89.5 MG/DL
IGM SER QL IEP: 173 MG/DL
KAPPA LC CSF-MCNC: 2.1 MG/DL
KAPPA LC SERPL-MCNC: 1.78 MG/DL

## 2019-09-16 ENCOUNTER — APPOINTMENT (OUTPATIENT)
Dept: SLEEP CENTER | Facility: HOME HEALTH | Age: 34
End: 2019-09-16
Payer: MEDICAID

## 2019-09-16 ENCOUNTER — OUTPATIENT (OUTPATIENT)
Dept: OUTPATIENT SERVICES | Facility: HOSPITAL | Age: 34
LOS: 1 days | End: 2019-09-16
Payer: MEDICAID

## 2019-09-16 PROCEDURE — 95800 SLP STDY UNATTENDED: CPT | Mod: 26

## 2019-09-16 PROCEDURE — 95800 SLP STDY UNATTENDED: CPT

## 2019-09-17 DIAGNOSIS — G47.33 OBSTRUCTIVE SLEEP APNEA (ADULT) (PEDIATRIC): ICD-10-CM

## 2019-09-19 ENCOUNTER — APPOINTMENT (OUTPATIENT)
Dept: ULTRASOUND IMAGING | Facility: CLINIC | Age: 34
End: 2019-09-19
Payer: MEDICAID

## 2019-09-19 ENCOUNTER — OUTPATIENT (OUTPATIENT)
Dept: OUTPATIENT SERVICES | Facility: HOSPITAL | Age: 34
LOS: 1 days | End: 2019-09-19

## 2019-09-19 PROCEDURE — 76881 US COMPL JOINT R-T W/IMG: CPT | Mod: 26,LT

## 2019-09-19 PROCEDURE — 76881 US COMPL JOINT R-T W/IMG: CPT | Mod: 26,RT

## 2019-10-01 ENCOUNTER — APPOINTMENT (OUTPATIENT)
Dept: RHEUMATOLOGY | Facility: CLINIC | Age: 34
End: 2019-10-01
Payer: MEDICAID

## 2019-10-01 VITALS
HEART RATE: 82 BPM | SYSTOLIC BLOOD PRESSURE: 143 MMHG | OXYGEN SATURATION: 97 % | BODY MASS INDEX: 26.61 KG/M2 | TEMPERATURE: 97.7 F | HEIGHT: 66 IN | DIASTOLIC BLOOD PRESSURE: 94 MMHG | WEIGHT: 165.56 LBS

## 2019-10-01 DIAGNOSIS — S93.409A SPRAIN OF UNSPECIFIED LIGAMENT OF UNSPECIFIED ANKLE, INITIAL ENCOUNTER: ICD-10-CM

## 2019-10-01 LAB
CA VI IGA AB: NORMAL
CA VI IGG AB: NORMAL
CA VI IGM AB: NORMAL
PSP IGA AB: NORMAL
PSP IGG AB: NORMAL
PSP IGM AB: NORMAL
SEROLOGY COMMENTS: NORMAL
SP-1 IGA AB: NORMAL
SP-1 IGG AB: NORMAL
SP-1 IGM AB: NORMAL

## 2019-10-01 PROCEDURE — 99214 OFFICE O/P EST MOD 30 MIN: CPT

## 2019-10-01 RX ORDER — NAPROXEN 500 MG/1
500 TABLET ORAL TWICE DAILY
Qty: 30 | Refills: 0 | Status: COMPLETED | COMMUNITY
Start: 2019-10-01 | End: 2019-10-16

## 2019-10-01 NOTE — HISTORY OF PRESENT ILLNESS
[___ Month(s) Ago] : [unfilled] month(s) ago [Fatigue] : fatigue [Depression] : depression [Joint Swelling] : joint swelling [Arthralgias] : arthralgias [Difficulty Standing] : difficulty standing [Difficulty Walking] : difficulty walking [Dry Eyes] : dry eyes [FreeTextEntry1] : Follow up: 10/1/19 \par Elevated inflammatory markers both ESR and CRP with no underlying autoimmune disease identified.  \par US negative for inflammatory arthritis, asymmetric enlargement of R median nerve suspicious for R carpal tunnel syndrome.\par Pt has negative specific test, has not yet made appt with Dr. Kendrick for EMG/NCT. \par Had sleep studies. \par She twisted left ankle about 10 days ago, when traveling in [place], went to local ER, told has normal xrays without fractures, trying to ice it and using Naproxen for pain control, states swelling and hematoma gone down. \par Has trouble to ambulate, comes on wheelchair accompanied by his boyfriend.\par \par \par Follow up: 8/29/19\par Elevated inflammatory markers both ESR and CRP with no underlying autoimmune disease. \par Found to have pulm nodules, saw pulm  Dr. Rodriguez who felt they were benign. Sleep study was recommended for sleep apnea, for  cervical lymphadenopathy no LAD on palpation to biopsy according to Dr. Marrero. \par Saw Neurology recommended autonomic testing. \par Pt continues to experiencing episodic flares of the fatigue mostly stress related, insomnia. \par With fatigue flares feels more body stiffness, has R wrist and thumb pain, worse during typing, intermittent tingling and numbness sensation of the thumb, ankle pain and left hip states unchanged. \par \par Follow up: 4/23/19\par Pt saw Hem: manual diff normal, repeat CBC no thrombocytosis\par No new complaints, labs negative, except moderately elevated ESR and CRP. \par Still with pruritus and intermittent urticaria, takes zyrtec to control symptoms. \par Denies fever, chills or cough.  \par Has not seen psychiatrist yet, very stressed out about many things, argues with BF. \par \par \par Initial consult: 4/1/19\par 35 yo female was referred by PMD Dr. Garcia for Rheum evaluation, pt has anxiety, fatigue and dizziness, symptoms started back  in June was on a plane and she felt nauseous and sick, felt  cold and sweaty and her knees went numb. She had tunnel vision and passed out for a few seconds. She went to the ED for this after landing in [place], work up has been negative. Had recurrent 4 similar episodes since then with dizziness,  no passing out since then.\par ROS: +  extreme fatigue, arthralgia and puffiness of her hands without dry skin, Raynaud;s disease or rash, but do reports full body itchiness recently. Can't sleep well. \par Dry eyes but no dry mouth, able to produce tears. \par She feels depressed since before a stressful deadline and she could not focus or function at all. \par Admitting to daily anxiety. Seeing a psychoanalyst weekly, but not seen psychiatrist. \par 20Ibs weight gain for 1 year.  \par Nausea \par Denies alopecia or malar rash.  \par Last week she is experiencing left sided should pain, thinks due to sleeping in wrong position\par R knee snapping sounds and sometimes ankles , knees swells up if kneels \par Has herniated L spine disc L1-L3 but denies chronic low back pain. \par Left groin small mobile Lymph node. 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 \par Denies personal or family hx of autoimmune disease. \par She had extensive labs done recently which showed mildly elevated ESR and CRP, negative RF.CCP, SWAPNIL. \par CBC with lymphocytosis and thrombocytosis: pending Hem consult.  [Anorexia] : no anorexia [Weight Loss] : no weight loss [Malaise] : no malaise [Fever] : no fever [Chills] : no chills [Malar Facial Rash] : no malar facial rash [Skin Lesions] : no lesions [Skin Nodules] : no skin nodules [Oral Ulcers] : no oral ulcers [Cough] : no cough [Dry Mouth] : no dry mouth [Dysphonia] : no dysphonia [Dysphagia] : no dysphagia [Joint Warmth] : no joint warmth [Joint Deformity] : no joint deformity [Decreased ROM] : no decreased range of motion [Morning Stiffness] : no morning stiffness [Falls] : no falls [Muscle Weakness] : no muscle weakness [Muscle Spasms] : no muscle spasms [Visual Changes] : no visual changes [Muscle Cramping] : no muscle cramping [Eye Pain] : no eye pain [Eye Redness] : no eye redness

## 2019-10-01 NOTE — PHYSICAL EXAM
[General Appearance - Alert] : alert [General Appearance - In No Acute Distress] : in no acute distress [General Appearance - Well Developed] : well developed [General Appearance - Well Nourished] : well nourished [Optic Disc Abnormality] : the optic disc were normal in size and color [Sclera] : the sclera and conjunctiva were normal [Examination Of The Oral Cavity] : the lips and gums were normal [Hearing Threshold Finger Rub Not [place]] : hearing was normal [Neck Appearance] : the appearance of the neck was normal [Oropharynx] : the oropharynx was normal [Thyroid Diffuse Enlargement] : the thyroid was not enlarged [Thyroid Nodule] : there were no palpable thyroid nodules [Auscultation Breath Sounds / Voice Sounds] : lungs were clear to auscultation bilaterally [Heart Rate And Rhythm] : heart rate was normal and rhythm regular [Heart Sounds] : normal S1 and S2 [Murmurs] : no murmurs [Heart Sounds Gallop] : no gallops [Edema] : there was no peripheral edema [Heart Sounds Pericardial Friction Rub] : no pericardial rub [Veins - Varicosity Changes] : there were no varicosital changes [Inguinal Lymph Nodes Enlarged Bilaterally] : inguinal [Musculoskeletal - Swelling] : no joint swelling seen [Nail Clubbing] : no clubbing  or cyanosis of the fingernails [Abnormal Walk] : normal gait [Motor Tone] : muscle strength and tone were normal [] : no rash [Skin Color & Pigmentation] : normal skin color and pigmentation [Oriented To Time, Place, And Person] : oriented to person, place, and time [Skin Lesions] : no skin lesions [FreeTextEntry1] : right tender wrist and overall puffy R hand, Tinel and Phalen has been negative, Left ankle dorsal and lateral malleolus mildly tender and swollen, areas of hematoma.

## 2019-10-01 NOTE — DATA REVIEWED
[FreeTextEntry1] : 1.0 cm complex left thyroid cyst. Follow-up thyroid ultrasound in 12 months \par is recommended. \par \par 2.4 x 1.1 x 1.7 cm right level 2 lymph node, as described above. \par \par In addition, focused examination in the left supraclavicular region was \par performed as patient's request and revealed no visualized soft tissue \par abnormality. \par \par Recommend follow-up ultrasound in 12 months. \par \par

## 2019-10-01 NOTE — REVIEW OF SYSTEMS
[Feeling Tired] : feeling tired [Recent Weight Gain (___ Lbs)] : recent [unfilled] ~Ulb weight gain [Dry Eyes] : dryness of the eyes [Arthralgias] : arthralgias [Joint Pain] : joint pain [Joint Swelling] : joint swelling [Itching] : itching [Dizziness] : dizziness [Limb Weakness] : limb weakness [Anxiety] : anxiety [Sleep Disturbances] : sleep disturbances [Depression] : depression [Swollen Glands] : swollen glands [Eye Pain] : no eye pain [Fever] : no fever [Feeling Poorly] : not feeling poorly [Red Eyes] : eyes not red [Eyesight Problems] : no eyesight problems [Discharge From Eyes] : no purulent discharge from the eyes [Eyes Itch] : no itching of the eyes [Nosebleeds] : no nosebleeds [Heart Rate Is Slow] : the heart rate was not slow [Heart Rate Is Fast] : the heart rate was not fast [Chest Pain] : no chest pain [Palpitations] : no palpitations [Leg Claudication] : no intermittent leg claudication [Lower Ext Edema] : no lower extremity edema [Shortness Of Breath] : no shortness of breath [Wheezing] : no wheezing [SOB on Exertion] : no shortness of breath during exertion [Cough] : no cough [Orthopnea] : no orthopnea [Abdominal Pain] : no abdominal pain [Vomiting] : no vomiting [Constipation] : no constipation [Heartburn] : no heartburn [Diarrhea] : no diarrhea [Melena] : no melena [Dysuria] : no dysuria [Pelvic Pain] : no pelvic pain [Vaginal Discharge] : no vaginal discharge [Joint Stiffness] : no joint stiffness [Limb Pain] : no limb pain [Limb Swelling] : no limb swelling [Skin Lesions] : no skin lesions [Skin Wound] : no skin wound [Confused] : no confusion [Convulsions] : no convulsions [Difficulty Walking] : no difficulty walking [Suicidal] : not suicidal [Change In Personality] : no personality change [Emotional Problems] : no emotional problems [Proptosis] : no proptosis [Hot Flashes] : no hot flashes [Muscle Weakness] : no muscle weakness [Easy Bleeding] : no tendency for easy bleeding [Easy Bruising] : no tendency for easy bruising [Swollen Glands In The Neck] : no swollen glands in the neck [FreeTextEntry7] : nausea  [FreeTextEntry4] : left ear ringing occasionally  [de-identified] : hopeless thoughts.  [de-identified] : left groin

## 2019-10-01 NOTE — ASSESSMENT
[FreeTextEntry1] : 35 yo female with stress induced  fatigue flares and dizziness,  has insomnia and recommended sleep studies, done 2 weeks ago, skin itchiness, also  reports arthralgia, puffiness of her hands, dry eyes without dry mouth. She has elevated ESR and CRP with normal other extensive Rheum labs and normal b/l hand and wrist Xray/ US with no evidence of inflammatory changes/erosions, synovtis or tenosynovitis, suspecting R CTS based on US. \par Pulmonary nodules,  Dr. Rodriguez  felt they were benign. \par \par Differential diagnosis of her presentation is most likely related to depression and she need a formal psychiatric evaluation which I discussed with pt again today. \par Persistently elevated inflammatory markers ( both ESR and CRP) for unknown etiology\par \par CTS: recommended to make appt with Dr. Hiren CASAREZ for EMG/NCS, pt to use wrist splint at night, avoid repetitive wrist activities and if EMG confirms diagnoses we talked about CTS steroid injection if symptomatic.  \par L ankle sprain: edema and hematoma improving, still unable to bare the weight due to pain, referral to Ortho Dr. Grove given, Naproxen 500mg BID for pain control \par Previously reported  intermittent urticaria and pruritus: not an active issue today, not seen Allergy/Immunology. \par \par 1.0 cm complex left thyroid cyst. Follow-up thyroid ultrasound in 12 months is recommended. \par \par \par f/u in 6 months or earlier

## 2019-11-14 ENCOUNTER — LABORATORY RESULT (OUTPATIENT)
Age: 34
End: 2019-11-14

## 2019-11-14 ENCOUNTER — APPOINTMENT (OUTPATIENT)
Dept: PEDIATRIC ALLERGY IMMUNOLOGY | Facility: CLINIC | Age: 34
End: 2019-11-14
Payer: MEDICAID

## 2019-11-14 VITALS
SYSTOLIC BLOOD PRESSURE: 127 MMHG | WEIGHT: 170 LBS | DIASTOLIC BLOOD PRESSURE: 91 MMHG | HEART RATE: 89 BPM | BODY MASS INDEX: 27.32 KG/M2 | HEIGHT: 66.14 IN

## 2019-11-14 DIAGNOSIS — J30.1 ALLERGIC RHINITIS DUE TO POLLEN: ICD-10-CM

## 2019-11-14 DIAGNOSIS — Z78.9 OTHER SPECIFIED HEALTH STATUS: ICD-10-CM

## 2019-11-14 PROCEDURE — 99204 OFFICE O/P NEW MOD 45 MIN: CPT

## 2019-11-19 ENCOUNTER — FORM ENCOUNTER (OUTPATIENT)
Age: 34
End: 2019-11-19

## 2019-11-19 LAB
A ALTERNATA IGE QN: <0.1 KUA/L
A FUMIGATUS IGE QN: <0.1 KUA/L
ALBUMIN SERPL ELPH-MCNC: 4 G/DL
ALP BLD-CCNC: 72 U/L
ALT SERPL-CCNC: 9 U/L
AMER BEECH IGE QN: 0
ANION GAP SERPL CALC-SCNC: 17 MMOL/L
AST SERPL-CCNC: 16 U/L
BASOPHILS # BLD AUTO: 0.05 K/UL
BASOPHILS NFR BLD AUTO: 0.6 %
BILIRUB SERPL-MCNC: <0.2 MG/DL
BOXELDER IGE QN: <0.1 KUA/L
BUN SERPL-MCNC: 12 MG/DL
C HERBARUM IGE QN: <0.1 KUA/L
C LUNATA IGE QN: <0.1 KUA/L
CALCIUM SERPL-MCNC: 9.8 MG/DL
CAT DANDER IGE QN: <0.1 KUA/L
CHLORIDE SERPL-SCNC: 101 MMOL/L
CMN PIGWEED IGE QN: <0.1 KUA/L
CO2 SERPL-SCNC: 20 MMOL/L
COCKLEBUR IGE QN: <0.1 KUA/L
COMMON RAGWEED IGE QN: <0.1 KUA/L
CREAT SERPL-MCNC: 0.8 MG/DL
CRP SERPL-MCNC: 1.25 MG/DL
D FARINAE IGE QN: <0.1 KUA/L
D PTERONYSS IGE QN: <0.1 KUA/L
DEPRECATED A ALTERNATA IGE RAST QL: 0
DEPRECATED A FUMIGATUS IGE RAST QL: 0
DEPRECATED A PULLULANS IGE RAST QL: 0
DEPRECATED AMER BEECH IGE RAST QL: <0.1 KUA/L
DEPRECATED BOXELDER IGE RAST QL: 0
DEPRECATED C HERBARUM IGE RAST QL: 0
DEPRECATED C LUNATA IGE RAST QL: 0
DEPRECATED CAT DANDER IGE RAST QL: 0
DEPRECATED COCKLEBUR IGE RAST QL: 0
DEPRECATED COMMON PIGWEED IGE RAST QL: 0
DEPRECATED COMMON RAGWEED IGE RAST QL: 0
DEPRECATED D FARINAE IGE RAST QL: 0
DEPRECATED D PTERONYSS IGE RAST QL: 0
DEPRECATED DOG DANDER IGE RAST QL: 0
DEPRECATED ENGL PLANTAIN IGE RAST QL: 0
DEPRECATED F MONILIFORME IGE RAST QL: 0
DEPRECATED GIANT RAGWEED IGE RAST QL: 0
DEPRECATED GOOSE FEATHER IGE RAST QL: 0
DEPRECATED GOOSEFOOT IGE RAST QL: 0
DEPRECATED KENT BLUE GRASS IGE RAST QL: 0
DEPRECATED LONDON PLANE IGE RAST QL: 0
DEPRECATED MUGWORT IGE RAST QL: 0
DEPRECATED P NOTATUM IGE RAST QL: 0
DEPRECATED R NIGRICANS IGE RAST QL: 0
DEPRECATED RED CEDAR IGE RAST QL: 0
DEPRECATED RED TOP GRASS IGE RAST QL: 0
DEPRECATED ROACH IGE RAST QL: 0
DEPRECATED SILVER BIRCH IGE RAST QL: 0
DEPRECATED TIMOTHY IGE RAST QL: 0
DEPRECATED WHITE ASH IGE RAST QL: 0
DEPRECATED WHITE HICKORY IGE RAST QL: 0
DEPRECATED WHITE OAK IGE RAST QL: 0
DOG DANDER IGE QN: <0.1 KUA/L
ENGL PLANTAIN IGE QN: <0.1 KUA/L
EOSINOPHIL # BLD AUTO: 0.05 K/UL
EOSINOPHIL NFR BLD AUTO: 0.6 %
ERYTHROCYTE [SEDIMENTATION RATE] IN BLOOD BY WESTERGREN METHOD: 81 MM/HR
F MONILIFORME IGE QN: <0.1 KUA/L
GIANT RAGWEED IGE QN: <0.1 KUA/L
GLUCOSE SERPL-MCNC: 102 MG/DL
GOOSE FEATHER IGE QN: <0.1 KUA/L
GOOSEFOOT IGE QN: <0.1 KUA/L
GRAY ALDER (T2) CLASS: 0
GRAY ALDER (T2) CONC: <0.1 KUA/L
HCT VFR BLD CALC: 39.8 %
HGB BLD-MCNC: 12.7 G/DL
IMM GRANULOCYTES NFR BLD AUTO: 0.2 %
KENT BLUE GRASS IGE QN: <0.1 KUA/L
LONDON PLANE IGE QN: <0.1 KUA/L
LYMPHOCYTES # BLD AUTO: 3.1 K/UL
LYMPHOCYTES NFR BLD AUTO: 38.2 %
MAN DIFF?: NORMAL
MCHC RBC-ENTMCNC: 28.3 PG
MCHC RBC-ENTMCNC: 31.9 GM/DL
MCV RBC AUTO: 88.6 FL
MOLD (AUREOBASIDIUM M12) CONC: <0.1 KUA/L
MONOCYTES # BLD AUTO: 0.44 K/UL
MONOCYTES NFR BLD AUTO: 5.4 %
MUGWORT IGE QN: <0.1 KUA/L
MULBERRY (T70) CLASS: 0
MULBERRY (T70) CONC: <0.1 KUA/L
NEUTROPHILS # BLD AUTO: 4.46 K/UL
NEUTROPHILS NFR BLD AUTO: 55 %
P NOTATUM IGE QN: <0.1 KUA/L
PLATELET # BLD AUTO: 464 K/UL
POTASSIUM SERPL-SCNC: 4.7 MMOL/L
PROT SERPL-MCNC: 7.5 G/DL
R NIGRICANS IGE QN: <0.1 KUA/L
RBC # BLD: 4.49 M/UL
RBC # FLD: 12.5 %
RED CEDAR IGE QN: <0.1 KUA/L
RED TOP GRASS IGE QN: <0.1 KUA/L
ROACH IGE QN: <0.1 KUA/L
SILVER BIRCH IGE QN: <0.1 KUA/L
SODIUM SERPL-SCNC: 138 MMOL/L
TIMOTHY IGE QN: <0.1 KUA/L
WBC # FLD AUTO: 8.12 K/UL
WHITE ASH IGE QN: <0.1 KUA/L
WHITE ELM IGE QN: 0
WHITE ELM IGE QN: <0.1 KUA/L
WHITE HICKORY IGE QN: <0.1 KUA/L
WHITE OAK IGE QN: <0.1 KUA/L

## 2019-11-20 ENCOUNTER — APPOINTMENT (OUTPATIENT)
Dept: RADIOLOGY | Facility: CLINIC | Age: 34
End: 2019-11-20

## 2019-11-20 ENCOUNTER — OUTPATIENT (OUTPATIENT)
Dept: OUTPATIENT SERVICES | Facility: HOSPITAL | Age: 34
LOS: 1 days | End: 2019-11-20
Payer: MEDICAID

## 2019-11-20 ENCOUNTER — APPOINTMENT (OUTPATIENT)
Dept: ORTHOPEDIC SURGERY | Facility: CLINIC | Age: 34
End: 2019-11-20
Payer: MEDICAID

## 2019-11-20 VITALS — WEIGHT: 170 LBS | RESPIRATION RATE: 16 BRPM | HEIGHT: 66.14 IN | BODY MASS INDEX: 27.32 KG/M2

## 2019-11-20 DIAGNOSIS — M25.372 OTHER INSTABILITY, LEFT ANKLE: ICD-10-CM

## 2019-11-20 DIAGNOSIS — M24.272 DISORDER OF LIGAMENT, LEFT ANKLE: ICD-10-CM

## 2019-11-20 DIAGNOSIS — M24.271 DISORDER OF LIGAMENT, RIGHT ANKLE: ICD-10-CM

## 2019-11-20 DIAGNOSIS — S93.412A SPRAIN OF CALCANEOFIBULAR LIGAMENT OF LEFT ANKLE, INITIAL ENCOUNTER: ICD-10-CM

## 2019-11-20 DIAGNOSIS — M25.572 PAIN IN LEFT ANKLE AND JOINTS OF LEFT FOOT: ICD-10-CM

## 2019-11-20 DIAGNOSIS — S93.492A SPRAIN OF OTHER LIGAMENT OF LEFT ANKLE, INITIAL ENCOUNTER: ICD-10-CM

## 2019-11-20 PROBLEM — Z78.9 NO SECONDHAND SMOKE EXPOSURE: Status: ACTIVE | Noted: 2019-11-20

## 2019-11-20 PROCEDURE — 73630 X-RAY EXAM OF FOOT: CPT | Mod: 26,LT

## 2019-11-20 PROCEDURE — 99204 OFFICE O/P NEW MOD 45 MIN: CPT

## 2019-11-20 PROCEDURE — 73610 X-RAY EXAM OF ANKLE: CPT | Mod: 26,LT

## 2019-11-20 RX ORDER — CETIRIZINE HCL 10 MG
TABLET ORAL
Refills: 0 | Status: DISCONTINUED | COMMUNITY
End: 2019-11-20

## 2019-11-20 NOTE — PHYSICAL EXAM
[Alert] : alert [Healthy Appearance] : healthy appearance [Well Nourished] : well nourished [No Acute Distress] : no acute distress [Well Developed] : well developed [No Photophobia] : no photophobia [No Discharge] : no discharge [Normal Pupil & Iris Size/Symmetry] : normal pupil and iris size and symmetry [Normal TMs] : both tympanic membranes were normal [Sclera Not Icteric] : sclera not icteric [Normal Nasal Mucosa] : the nasal mucosa was normal [Normal Outer Ear/Nose] : the ears and nose were normal in appearance [Normal Lips/Tongue] : the lips and tongue were normal [No Thrush] : no thrush [Normal Tonsils] : normal tonsils [Normal Dentition] : normal dentition [No Oral Lesions or Ulcers] : no oral lesions or ulcers [Boggy Nasal Turbinates] : boggy and/or pale nasal turbinates [Posterior Pharyngeal Cobblestoning] : posterior pharyngeal cobblestoning [Normal Rate and Effort] : normal respiratory rhythm and effort [Supple] : the neck was supple [Normal Palpation] : palpation of the chest revealed no abnormalities [No Crackles] : no crackles [No Retractions] : no retractions [Bilateral Audible Breath Sounds] : bilateral audible breath sounds [Normal Rate] : heart rate was normal  [Wheezing] : no wheezing was heard [Normal S1, S2] : normal S1 and S2 [Regular Rhythm] : with a regular rhythm [Soft] : abdomen soft [Not Tender] : non-tender [Not Distended] : not distended [No HSM] : no hepato-splenomegaly [Normal Cervical Lymph Nodes] : cervical [Normal Axillary Lumph Nodes] : axillary [Skin Intact] : skin intact  [No Rash] : no rash [No Skin Lesions] : no skin lesions [Eczematous Patches] : no eczematous patches [Xerosis] : no xerosis [No Joint Swelling or Erythema] : no joint swelling or erythema [No clubbing] : no clubbing [No Edema] : no edema [Normal Mood] : mood was normal [No Cyanosis] : no cyanosis [Normal Affect] : affect was normal [Alert, Awake, Oriented as Age-Appropriate] : alert, awake, oriented as age appropriate

## 2019-11-20 NOTE — HISTORY OF PRESENT ILLNESS
[de-identified] : 33 yo F with history of chronic urticaria here for initial evaluation. \rosalia Patient states that she started having itchy red raised hives about 2 years ago after a sinus infection and two courses of antibiotics. She started at that time to take daily generic Zyrtec with relief. She states that all allergy testing was negative and she never found out a reason why she has these hives. Hives are well controlled with antihistamine at this time, but if she doesn't take it she will start being itchy. She also reports one episode of tongue swelling when hives first appeared but hasn't had any angioedema since then. \rosalia Harris also had an episode of fainting on a plane in March of last year and stress induced severe fatigue. She has had extensive neuro work up and rheumatological investigation, but other than elevated inflammatory markers (ESR and CRP) nothing was found. She is not complaining of any joint pain, except her wrists, she undergoes screening for routine medical issues. \rosalia Harris also states that she may have seasonal allergies, she would get sneezing, runny eyes and nose in spring time. These symptoms are also relieved by antihistamines.

## 2019-11-20 NOTE — REASON FOR VISIT
[Initial Consultation] : an initial consultation for [Allergy Evaluation/ Skin Testing] : allergy evaluation and or skin testing [Hives] : hives

## 2019-11-20 NOTE — CONSULT LETTER
[Dear  ___] : Dear  [unfilled], [Consult Letter:] : I had the pleasure of evaluating your patient, [unfilled]. [Please see my note below.] : Please see my note below. [Consult Closing:] : Thank you very much for allowing me to participate in the care of this patient.  If you have any questions, please do not hesitate to contact me. [Sincerely,] : Sincerely, [FreeTextEntry2] : AMOS JENSEN [FreeTextEntry3] : Jeniffer Mcpherson MD\par Attending Physician, Division of Allergy/Immunology\par [facility]

## 2019-11-20 NOTE — REVIEW OF SYSTEMS
[Fatigue] : fatigue [Urticaria] : urticaria [Pruritis] : pruritis [Dry Skin] : no ~L dry skin [Nl] : Genitourinary

## 2019-11-20 NOTE — SOCIAL HISTORY
[Apartment] : [unfilled] lives in an apartment  [Central Forced Air] : heating provided by central forced air [Central] : air conditioning provided by central unit [Humidifier] : uses a humidifier [Cockroaches] : Patient states that there are no cockroaches in the home [Dehumidifier] : does not use a dehumidifier [Dust Mite Covers] : does not have dust mite covers [Feather Pillows] : does not have feather pillows [Bedroom] : not in the bedroom [Feather Comforter] : does not have a feather comforter [Living Area] : not in the living area [None] : none [Smokers in Household] : there are no smokers in the home [de-identified] : area rugs in living room

## 2019-11-21 LAB
COCKSFOOT IGE QN: <0.1 KUA/L
DEPRECATED COCKSFOOT IGE RAST QL: 0

## 2019-12-05 ENCOUNTER — APPOINTMENT (OUTPATIENT)
Dept: PEDIATRIC ALLERGY IMMUNOLOGY | Facility: CLINIC | Age: 34
End: 2019-12-05
Payer: MEDICAID

## 2019-12-05 VITALS — DIASTOLIC BLOOD PRESSURE: 80 MMHG | SYSTOLIC BLOOD PRESSURE: 110 MMHG

## 2019-12-05 VITALS
DIASTOLIC BLOOD PRESSURE: 84 MMHG | SYSTOLIC BLOOD PRESSURE: 127 MMHG | HEIGHT: 66.14 IN | WEIGHT: 170 LBS | HEART RATE: 111 BPM | BODY MASS INDEX: 27.32 KG/M2

## 2019-12-05 DIAGNOSIS — R14.2 ERUCTATION: ICD-10-CM

## 2019-12-05 DIAGNOSIS — R21 RASH AND OTHER NONSPECIFIC SKIN ERUPTION: ICD-10-CM

## 2019-12-05 PROCEDURE — 99213 OFFICE O/P EST LOW 20 MIN: CPT

## 2019-12-05 RX ORDER — FEXOFENADINE HYDROCHLORIDE 180 MG/1
180 TABLET ORAL DAILY
Qty: 30 | Refills: 2 | Status: DISCONTINUED | COMMUNITY
Start: 2019-11-14 | End: 2019-12-05

## 2019-12-05 RX ORDER — UBIDECARENONE/VIT E ACET 100MG-5
CAPSULE ORAL
Refills: 0 | Status: ACTIVE | COMMUNITY

## 2019-12-05 NOTE — HISTORY OF PRESENT ILLNESS
[de-identified] : 35 yo F with chronic urticaria here for follow up, states that she is doing well, patient was prescribed Allerga on the last visit, but had nausea associated with taking it and switched back to zyrtec, which she takes every other day with relief. If she doesn't take it she feels itchiness of palms. She hasn't had any more fainting episodes, saw orthopedics and is going for imaging of her L foot soon, she also needs to see cardiology for feeling of heaviness and fatigue when standing for short period of time, this happens sometimes. Patient also complaining that she is burping a lot..

## 2019-12-05 NOTE — REASON FOR VISIT
[Routine Follow-Up] : a routine follow-up visit for [Hives] : hives [FreeTextEntry2] : fainting episodes, elevated inflammatory markers

## 2019-12-05 NOTE — PHYSICAL EXAM
[Alert] : alert [Well Nourished] : well nourished [Healthy Appearance] : healthy appearance [No Acute Distress] : no acute distress [Well Developed] : well developed [No Discharge] : no discharge [Normal Pupil & Iris Size/Symmetry] : normal pupil and iris size and symmetry [No Photophobia] : no photophobia [Sclera Not Icteric] : sclera not icteric [Normal TMs] : both tympanic membranes were normal [Normal Outer Ear/Nose] : the ears and nose were normal in appearance [Normal Lips/Tongue] : the lips and tongue were normal [Normal Nasal Mucosa] : the nasal mucosa was normal [No Thrush] : no thrush [Normal Tonsils] : normal tonsils [No Oral Lesions or Ulcers] : no oral lesions or ulcers [Normal Dentition] : normal dentition [Boggy Nasal Turbinates] : boggy and/or pale nasal turbinates [Supple] : the neck was supple [Posterior Pharyngeal Cobblestoning] : posterior pharyngeal cobblestoning [Normal Palpation] : palpation of the chest revealed no abnormalities [Normal Rate and Effort] : normal respiratory rhythm and effort [Bilateral Audible Breath Sounds] : bilateral audible breath sounds [No Retractions] : no retractions [No Crackles] : no crackles [Wheezing] : no wheezing was heard [Normal Rate] : heart rate was normal  [Regular Rhythm] : with a regular rhythm [Normal S1, S2] : normal S1 and S2 [Not Tender] : non-tender [Soft] : abdomen soft [No HSM] : no hepato-splenomegaly [Not Distended] : not distended [Normal Cervical Lymph Nodes] : cervical [Skin Intact] : skin intact  [Normal Axillary Lumph Nodes] : axillary [Eczematous Patches] : no eczematous patches [No Rash] : no rash [No Skin Lesions] : no skin lesions [No Joint Swelling or Erythema] : no joint swelling or erythema [Xerosis] : no xerosis [No clubbing] : no clubbing [No Edema] : no edema [No Cyanosis] : no cyanosis [Normal Mood] : mood was normal [Normal Affect] : affect was normal [de-identified] : +tonsills [Alert, Awake, Oriented as Age-Appropriate] : alert, awake, oriented as age appropriate

## 2019-12-10 LAB
25(OH)D3 SERPL-MCNC: 40.1 NG/ML
CRP SERPL-MCNC: 1.28 MG/DL
ERYTHROCYTE [SEDIMENTATION RATE] IN BLOOD BY WESTERGREN METHOD: 54 MM/HR
TRYPTASE: 4.5 NG/ML
VIT B12 SERPL-MCNC: 498 PG/ML

## 2020-01-03 ENCOUNTER — TRANSCRIPTION ENCOUNTER (OUTPATIENT)
Age: 35
End: 2020-01-03

## 2020-01-09 ENCOUNTER — APPOINTMENT (OUTPATIENT)
Dept: PEDIATRIC ALLERGY IMMUNOLOGY | Facility: CLINIC | Age: 35
End: 2020-01-09
Payer: MEDICAID

## 2020-01-09 VITALS
HEIGHT: 66.14 IN | WEIGHT: 166 LBS | HEART RATE: 108 BPM | SYSTOLIC BLOOD PRESSURE: 123 MMHG | DIASTOLIC BLOOD PRESSURE: 86 MMHG | BODY MASS INDEX: 26.68 KG/M2

## 2020-01-09 DIAGNOSIS — L50.9 URTICARIA, UNSPECIFIED: ICD-10-CM

## 2020-01-09 DIAGNOSIS — L29.9 PRURITUS, UNSPECIFIED: ICD-10-CM

## 2020-01-09 PROCEDURE — 99213 OFFICE O/P EST LOW 20 MIN: CPT

## 2020-01-10 ENCOUNTER — APPOINTMENT (OUTPATIENT)
Dept: INTERNAL MEDICINE | Facility: CLINIC | Age: 35
End: 2020-01-10
Payer: MEDICAID

## 2020-01-10 VITALS
DIASTOLIC BLOOD PRESSURE: 90 MMHG | BODY MASS INDEX: 26.68 KG/M2 | HEIGHT: 66.14 IN | HEART RATE: 119 BPM | TEMPERATURE: 98.2 F | WEIGHT: 166 LBS | OXYGEN SATURATION: 99 % | SYSTOLIC BLOOD PRESSURE: 130 MMHG

## 2020-01-10 PROCEDURE — 99214 OFFICE O/P EST MOD 30 MIN: CPT

## 2020-01-12 NOTE — HEALTH RISK ASSESSMENT
[[id number]] : 0 [0] : 2) Feeling down, depressed, or hopeless: Not at all (0) [FreeTextEntry1] : anxiety

## 2020-01-12 NOTE — HISTORY OF PRESENT ILLNESS
[FreeTextEntry8] : Increase in anxiety.\par \par Some fights with boyfriend (been together 18 yrs) recently.  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  Hasn't seen therapist in about 2 weeks because of holidays, that isn't helping.

## 2020-01-12 NOTE — PHYSICAL EXAM
[No Focal Deficits] : no focal deficits [Normal Gait] : normal gait [Normal] : no acute distress, well nourished, well developed and well-appearing [Normal Insight/Judgement] : insight and judgment were intact [Normal Affect] : the affect was normal [Alert and Oriented x3] : oriented to person, place, and time

## 2020-01-16 ENCOUNTER — RX RENEWAL (OUTPATIENT)
Age: 35
End: 2020-01-16

## 2020-01-16 PROBLEM — L29.9 PRURITUS: Status: ACTIVE | Noted: 2019-04-01

## 2020-01-16 PROBLEM — L50.9 URTICARIA: Status: ACTIVE | Noted: 2019-12-05

## 2020-01-16 PROBLEM — L29.9 ITCHY SKIN: Status: ACTIVE | Noted: 2019-11-14

## 2020-01-16 RX ORDER — CETIRIZINE HCL 10 MG
TABLET ORAL
Refills: 0 | Status: ACTIVE | COMMUNITY

## 2020-01-16 NOTE — HISTORY OF PRESENT ILLNESS
[de-identified] : Steven is a 35 yo F with chronic urticaria here for follow up, started on Nexium last visit and states that burping has improved, takes daily antihistamines and if she stops she gets itchy hands. She hasn't had any more fainting episodes, states that she is feeling very stressed and will see a therapist for that.

## 2020-01-16 NOTE — PHYSICAL EXAM
[Alert] : alert [Well Nourished] : well nourished [Healthy Appearance] : healthy appearance [No Acute Distress] : no acute distress [Normal Pupil & Iris Size/Symmetry] : normal pupil and iris size and symmetry [Well Developed] : well developed [No Discharge] : no discharge [No Photophobia] : no photophobia [Sclera Not Icteric] : sclera not icteric [Normal TMs] : both tympanic membranes were normal [Normal Nasal Mucosa] : the nasal mucosa was normal [Normal Outer Ear/Nose] : the ears and nose were normal in appearance [Normal Lips/Tongue] : the lips and tongue were normal [Normal Tonsils] : normal tonsils [No Thrush] : no thrush [Normal Dentition] : normal dentition [Boggy Nasal Turbinates] : boggy and/or pale nasal turbinates [No Oral Lesions or Ulcers] : no oral lesions or ulcers [Posterior Pharyngeal Cobblestoning] : posterior pharyngeal cobblestoning [Supple] : the neck was supple [Normal Rate and Effort] : normal respiratory rhythm and effort [Normal Palpation] : palpation of the chest revealed no abnormalities [No Retractions] : no retractions [No Crackles] : no crackles [Bilateral Audible Breath Sounds] : bilateral audible breath sounds [Normal Rate] : heart rate was normal  [Regular Rhythm] : with a regular rhythm [Normal S1, S2] : normal S1 and S2 [Soft] : abdomen soft [Not Tender] : non-tender [Not Distended] : not distended [No HSM] : no hepato-splenomegaly [No Rash] : no rash [Skin Intact] : skin intact  [No Skin Lesions] : no skin lesions [No Joint Swelling or Erythema] : no joint swelling or erythema [No Edema] : no edema [Alert, Awake, Oriented as Age-Appropriate] : alert, awake, oriented as age appropriate [Wheezing] : no wheezing was heard [Eczematous Patches] : no eczematous patches [Xerosis] : no xerosis [de-identified] : +tonsils

## 2020-02-04 ENCOUNTER — RX RENEWAL (OUTPATIENT)
Age: 35
End: 2020-02-04

## 2020-02-17 LAB
CRP SERPL-MCNC: 1.02 MG/DL
ERYTHROCYTE [SEDIMENTATION RATE] IN BLOOD BY WESTERGREN METHOD: 70 MM/HR

## 2020-03-02 ENCOUNTER — RX RENEWAL (OUTPATIENT)
Age: 35
End: 2020-03-02

## 2020-06-03 ENCOUNTER — RX CHANGE (OUTPATIENT)
Age: 35
End: 2020-06-03

## 2020-06-05 ENCOUNTER — RX RENEWAL (OUTPATIENT)
Age: 35
End: 2020-06-05

## 2020-06-05 ENCOUNTER — TRANSCRIPTION ENCOUNTER (OUTPATIENT)
Age: 35
End: 2020-06-05

## 2020-09-02 ENCOUNTER — RX RENEWAL (OUTPATIENT)
Age: 35
End: 2020-09-02

## 2020-10-26 ENCOUNTER — RX RENEWAL (OUTPATIENT)
Age: 35
End: 2020-10-26

## 2020-10-27 ENCOUNTER — TRANSCRIPTION ENCOUNTER (OUTPATIENT)
Age: 35
End: 2020-10-27

## 2021-01-11 ENCOUNTER — NON-APPOINTMENT (OUTPATIENT)
Age: 36
End: 2021-01-11

## 2021-01-12 ENCOUNTER — RX RENEWAL (OUTPATIENT)
Age: 36
End: 2021-01-12

## 2021-02-11 ENCOUNTER — RX RENEWAL (OUTPATIENT)
Age: 36
End: 2021-02-11

## 2021-03-10 ENCOUNTER — RX RENEWAL (OUTPATIENT)
Age: 36
End: 2021-03-10

## 2021-03-18 ENCOUNTER — TRANSCRIPTION ENCOUNTER (OUTPATIENT)
Age: 36
End: 2021-03-18

## 2021-03-19 ENCOUNTER — TRANSCRIPTION ENCOUNTER (OUTPATIENT)
Age: 36
End: 2021-03-19

## 2021-03-24 ENCOUNTER — APPOINTMENT (OUTPATIENT)
Dept: FAMILY MEDICINE | Facility: CLINIC | Age: 36
End: 2021-03-24
Payer: COMMERCIAL

## 2021-03-24 DIAGNOSIS — F41.9 ANXIETY DISORDER, UNSPECIFIED: ICD-10-CM

## 2021-03-24 PROCEDURE — 99214 OFFICE O/P EST MOD 30 MIN: CPT | Mod: 95

## 2021-03-24 NOTE — HISTORY OF PRESENT ILLNESS
[Home] : at home, [unfilled] , at the time of the visit. [Other Location: e.g. Home (Enter Location, City,State)___] : at [unfilled] [Verbal consent obtained from patient] : the patient, [unfilled] [de-identified] : 35 yo female here for follow up TEB.\par \par In 2018 had fainting episode on plane. Has had lingering fatigue afterwards. Was seeing Rheum for eval. Only CRP ESR was high. Other autoimmune testing negative. Was supposed to do tilt table test and eval for possible carpal tunnel in right hand. Still with moments with severe fatigue. Makes it impossible to work in those moments. Sometimes lasts a week or more. Happens every few months. Has been taking buspar for over a year for anxiety which helps.\par \par Patient reports being sick 3 times last winter. Had high fever for about a week in January. Also was sick in Febuary and March. Thinks she may have had COVID at that time.  Reports the fatigue changed after that point to week long episodes of lack of focus.

## 2021-03-26 ENCOUNTER — TRANSCRIPTION ENCOUNTER (OUTPATIENT)
Age: 36
End: 2021-03-26

## 2021-05-05 ENCOUNTER — TRANSCRIPTION ENCOUNTER (OUTPATIENT)
Age: 36
End: 2021-05-05

## 2021-05-27 ENCOUNTER — APPOINTMENT (OUTPATIENT)
Dept: FAMILY MEDICINE | Facility: CLINIC | Age: 36
End: 2021-05-27
Payer: COMMERCIAL

## 2021-05-27 VITALS
HEART RATE: 108 BPM | OXYGEN SATURATION: 97 % | WEIGHT: 178 LBS | RESPIRATION RATE: 16 BRPM | TEMPERATURE: 97.6 F | SYSTOLIC BLOOD PRESSURE: 121 MMHG | BODY MASS INDEX: 28.61 KG/M2 | DIASTOLIC BLOOD PRESSURE: 85 MMHG | HEIGHT: 66 IN

## 2021-05-27 VITALS — HEART RATE: 93 BPM

## 2021-05-27 DIAGNOSIS — R63.5 ABNORMAL WEIGHT GAIN: ICD-10-CM

## 2021-05-27 DIAGNOSIS — R53.83 OTHER FATIGUE: ICD-10-CM

## 2021-05-27 DIAGNOSIS — M25.552 PAIN IN LEFT HIP: ICD-10-CM

## 2021-05-27 PROCEDURE — 36415 COLL VENOUS BLD VENIPUNCTURE: CPT

## 2021-05-27 PROCEDURE — 99072 ADDL SUPL MATRL&STAF TM PHE: CPT

## 2021-05-27 PROCEDURE — 99395 PREV VISIT EST AGE 18-39: CPT | Mod: 25

## 2021-05-27 PROCEDURE — 99214 OFFICE O/P EST MOD 30 MIN: CPT | Mod: 25

## 2021-05-27 RX ORDER — MELOXICAM 15 MG/1
15 TABLET ORAL DAILY
Qty: 30 | Refills: 2 | Status: DISCONTINUED | COMMUNITY
Start: 2019-11-20 | End: 2021-05-27

## 2021-05-27 RX ORDER — ESOMEPRAZOLE MAGNESIUM 40 MG/1
40 CAPSULE, DELAYED RELEASE ORAL DAILY
Qty: 30 | Refills: 3 | Status: DISCONTINUED | COMMUNITY
Start: 2019-12-05 | End: 2021-05-27

## 2021-05-27 RX ORDER — OMEPRAZOLE 40 MG/1
40 CAPSULE, DELAYED RELEASE ORAL
Qty: 30 | Refills: 0 | Status: DISCONTINUED | COMMUNITY
Start: 2020-01-16 | End: 2021-05-27

## 2021-05-27 RX ORDER — BACILLUS COAGULANS/INULIN 1B-250 MG
CAPSULE ORAL
Refills: 0 | Status: DISCONTINUED | COMMUNITY
End: 2021-05-27

## 2021-05-27 RX ORDER — PROPRANOLOL HYDROCHLORIDE 10 MG/1
10 TABLET ORAL 3 TIMES DAILY
Qty: 30 | Refills: 0 | Status: DISCONTINUED | COMMUNITY
Start: 2020-01-10 | End: 2021-05-27

## 2021-05-27 NOTE — HEALTH RISK ASSESSMENT
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

## 2021-05-27 NOTE — HISTORY OF PRESENT ILLNESS
[de-identified] : 35 yo female here for CPE. Feeling well today, no complaints. \par \par Reports improvement in fatigue and brain fog since getting the COVID vaccine. \par \par Still with hair loss. 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 Not as active. Has had issues in the past where she has done starvation dieting and reporting having some eating disorder symptoms.

## 2021-05-27 NOTE — ASSESSMENT
[FreeTextEntry1] : CPE- Well exam, comprehensive labs ordered. STD panel declined. PAP up to date, follow up labs

## 2021-05-27 NOTE — REVIEW OF SYSTEMS
[Fatigue] : fatigue [Recent Change In Weight] : ~T recent weight change [Hair Changes] : hair changes [Negative] : Heme/Lymph

## 2021-06-01 ENCOUNTER — TRANSCRIPTION ENCOUNTER (OUTPATIENT)
Age: 36
End: 2021-06-01

## 2021-06-04 ENCOUNTER — TRANSCRIPTION ENCOUNTER (OUTPATIENT)
Age: 36
End: 2021-06-04

## 2021-06-08 ENCOUNTER — APPOINTMENT (OUTPATIENT)
Dept: FAMILY MEDICINE | Facility: CLINIC | Age: 36
End: 2021-06-08
Payer: COMMERCIAL

## 2021-06-08 DIAGNOSIS — Z13.220 ENCOUNTER FOR SCREENING FOR LIPOID DISORDERS: ICD-10-CM

## 2021-06-08 DIAGNOSIS — E61.1 IRON DEFICIENCY: ICD-10-CM

## 2021-06-08 DIAGNOSIS — D72.829 ELEVATED WHITE BLOOD CELL COUNT, UNSPECIFIED: ICD-10-CM

## 2021-06-08 LAB
25(OH)D3 SERPL-MCNC: 51.1 NG/ML
ALBUMIN SERPL ELPH-MCNC: 4 G/DL
ALP BLD-CCNC: 74 U/L
ALT SERPL-CCNC: 13 U/L
ANION GAP SERPL CALC-SCNC: 16 MMOL/L
APPEARANCE: ABNORMAL
AST SERPL-CCNC: 20 U/L
BACTERIA: NEGATIVE
BASOPHILS # BLD AUTO: 0.04 K/UL
BASOPHILS NFR BLD AUTO: 0.3 %
BILIRUB SERPL-MCNC: 0.2 MG/DL
BILIRUBIN URINE: NEGATIVE
BLOOD URINE: NEGATIVE
BUN SERPL-MCNC: 12 MG/DL
CALCIUM OXALATE CRYSTALS: ABNORMAL
CALCIUM SERPL-MCNC: 9.7 MG/DL
CHLORIDE SERPL-SCNC: 105 MMOL/L
CHOLEST SERPL-MCNC: 247 MG/DL
CO2 SERPL-SCNC: 17 MMOL/L
COLOR: YELLOW
COVID-19 NUCLEOCAPSID  GAM ANTIBODY INTERPRETATION: NEGATIVE
CREAT SERPL-MCNC: 0.8 MG/DL
CRP SERPL-MCNC: 14 MG/L
EOSINOPHIL # BLD AUTO: 0.08 K/UL
EOSINOPHIL NFR BLD AUTO: 0.7 %
ERYTHROCYTE [SEDIMENTATION RATE] IN BLOOD BY WESTERGREN METHOD: 4 MM/HR
ESTIMATED AVERAGE GLUCOSE: 111 MG/DL
FERRITIN SERPL-MCNC: 12 NG/ML
FOLATE SERPL-MCNC: 11.7 NG/ML
GLUCOSE QUALITATIVE U: NEGATIVE
GLUCOSE SERPL-MCNC: 90 MG/DL
HBA1C MFR BLD HPLC: 5.5 %
HCT VFR BLD CALC: 39.3 %
HDLC SERPL-MCNC: 86 MG/DL
HGB BLD-MCNC: 12.9 G/DL
HYALINE CASTS: 0 /LPF
IMM GRANULOCYTES NFR BLD AUTO: 0.2 %
IRON SATN MFR SERPL: 13 %
IRON SERPL-MCNC: 66 UG/DL
KETONES URINE: ABNORMAL
LDLC SERPL CALC-MCNC: 126 MG/DL
LEUKOCYTE ESTERASE URINE: ABNORMAL
LYMPHOCYTES # BLD AUTO: 4.26 K/UL
LYMPHOCYTES NFR BLD AUTO: 36.7 %
MAN DIFF?: NORMAL
MCHC RBC-ENTMCNC: 28.5 PG
MCHC RBC-ENTMCNC: 32.8 GM/DL
MCV RBC AUTO: 86.9 FL
MICROSCOPIC-UA: NORMAL
MONOCYTES # BLD AUTO: 0.57 K/UL
MONOCYTES NFR BLD AUTO: 4.9 %
NEUTROPHILS # BLD AUTO: 6.64 K/UL
NEUTROPHILS NFR BLD AUTO: 57.2 %
NITRITE URINE: NEGATIVE
NONHDLC SERPL-MCNC: 162 MG/DL
PH URINE: 5.5
PLATELET # BLD AUTO: 478 K/UL
POTASSIUM SERPL-SCNC: 4.7 MMOL/L
PROT SERPL-MCNC: 7.5 G/DL
PROTEIN URINE: NORMAL
RBC # BLD: 4.52 M/UL
RBC # FLD: 12.6 %
RED BLOOD CELLS URINE: 6 /HPF
SARS-COV-2 AB SERPL QL IA: 0.09 INDEX
SODIUM SERPL-SCNC: 138 MMOL/L
SPECIFIC GRAVITY URINE: 1.03
SQUAMOUS EPITHELIAL CELLS: 16 /HPF
T4 FREE SERPL-MCNC: 1.3 NG/DL
TIBC SERPL-MCNC: 496 UG/DL
TRANSFERRIN SERPL-MCNC: 468 MG/DL
TRIGL SERPL-MCNC: 179 MG/DL
TSH SERPL-ACNC: 0.89 UIU/ML
UIBC SERPL-MCNC: 430 UG/DL
UROBILINOGEN URINE: NORMAL
VIT B12 SERPL-MCNC: 403 PG/ML
WBC # FLD AUTO: 11.61 K/UL
WHITE BLOOD CELLS URINE: 15 /HPF

## 2021-06-08 PROCEDURE — 99214 OFFICE O/P EST MOD 30 MIN: CPT | Mod: 95

## 2021-06-08 RX ORDER — OMEGA-3/DHA/EPA/FISH OIL 300-1000MG
1000 CAPSULE ORAL
Refills: 0 | Status: ACTIVE | COMMUNITY
Start: 2021-06-08

## 2021-06-08 NOTE — HISTORY OF PRESENT ILLNESS
[Home] : at home, [unfilled] , at the time of the visit. [Other Location: e.g. Home (Enter Location, City,State)___] : at [unfilled] [Verbal consent obtained from patient] : the patient, [unfilled] [de-identified] : 35 yo female here for acute TEB to go over labs. \par \par Recent blood work reviewed showing hypertriglyceridemia. Patient had been on fish oils in the past, is no longer taking. \par \par H/o of CRP elevation in the past which she saw Rheum for. Hasn't followed up. We did repeat her CRP and it was 14, with new threshold being > 4. Her WBC was also elevated. Denying cold symptoms or recent illness.\par \par Patient with abnormal u/a. Denying urinary burning or frequency. Was likely dehydrated when giving sample.\par \par C/o breast pain intermittently recently. Denying having felt lumps in her breasts. Had recent GYN visit with breast exam and reports all was normal.

## 2021-06-11 ENCOUNTER — APPOINTMENT (OUTPATIENT)
Dept: DERMATOLOGY | Facility: CLINIC | Age: 36
End: 2021-06-11
Payer: COMMERCIAL

## 2021-06-11 DIAGNOSIS — Z12.83 ENCOUNTER FOR SCREENING FOR MALIGNANT NEOPLASM OF SKIN: ICD-10-CM

## 2021-06-11 DIAGNOSIS — L65.0 TELOGEN EFFLUVIUM: ICD-10-CM

## 2021-06-11 DIAGNOSIS — D22.9 MELANOCYTIC NEVI, UNSPECIFIED: ICD-10-CM

## 2021-06-11 PROCEDURE — 99203 OFFICE O/P NEW LOW 30 MIN: CPT

## 2021-06-11 PROCEDURE — 99072 ADDL SUPL MATRL&STAF TM PHE: CPT

## 2021-06-11 RX ORDER — BENZOYL PEROXIDE 100 MG/ML
10 LIQUID TOPICAL
Qty: 1 | Refills: 5 | Status: ACTIVE | COMMUNITY
Start: 2021-06-11 | End: 1900-01-01

## 2021-06-11 RX ORDER — CLINDAMYCIN PHOSPHATE 10 MG/ML
1 LOTION TOPICAL
Qty: 1 | Refills: 2 | Status: ACTIVE | COMMUNITY
Start: 2021-06-11 | End: 1900-01-01

## 2021-06-29 RX ORDER — DROSPIRENONE AND ETHINYL ESTRADIOL 0.02-3(28)
3-0.02 KIT ORAL
Qty: 1 | Refills: 0 | Status: ACTIVE | COMMUNITY
Start: 2018-06-04 | End: 1900-01-01

## 2021-07-07 ENCOUNTER — RX CHANGE (OUTPATIENT)
Age: 36
End: 2021-07-07

## 2021-07-07 RX ORDER — FERROUS GLUCONATE 240(27)MG
240 (27 FE) TABLET ORAL DAILY
Qty: 90 | Refills: 1 | Status: DISCONTINUED | COMMUNITY
Start: 2021-06-08 | End: 2021-07-07

## 2021-07-07 RX ORDER — BUSPIRONE HYDROCHLORIDE 5 MG/1
5 TABLET ORAL
Qty: 180 | Refills: 0 | Status: DISCONTINUED | COMMUNITY
Start: 2020-01-10 | End: 2021-07-07

## 2021-07-08 ENCOUNTER — RX RENEWAL (OUTPATIENT)
Age: 36
End: 2021-07-08

## 2021-07-13 ENCOUNTER — APPOINTMENT (OUTPATIENT)
Dept: DERMATOLOGY | Facility: CLINIC | Age: 36
End: 2021-07-13
Payer: COMMERCIAL

## 2021-07-13 DIAGNOSIS — L02.92 FURUNCLE, UNSPECIFIED: ICD-10-CM

## 2021-07-13 DIAGNOSIS — L60.3 NAIL DYSTROPHY: ICD-10-CM

## 2021-07-13 PROCEDURE — 99072 ADDL SUPL MATRL&STAF TM PHE: CPT

## 2021-07-13 PROCEDURE — 99213 OFFICE O/P EST LOW 20 MIN: CPT

## 2021-07-13 RX ORDER — CICLOPIROX 80 MG/ML
8 SOLUTION TOPICAL
Qty: 1 | Refills: 2 | Status: ACTIVE | COMMUNITY
Start: 2021-07-13 | End: 1900-01-01

## 2021-07-14 ENCOUNTER — TRANSCRIPTION ENCOUNTER (OUTPATIENT)
Age: 36
End: 2021-07-14

## 2021-07-14 LAB
APPEARANCE: CLEAR
BACTERIA: NEGATIVE
BASOPHILS # BLD AUTO: 0.04 K/UL
BASOPHILS NFR BLD AUTO: 0.4 %
BILIRUBIN URINE: NEGATIVE
BLOOD URINE: NEGATIVE
COLOR: NORMAL
CRP SERPL-MCNC: 9 MG/L
EOSINOPHIL # BLD AUTO: 0.12 K/UL
EOSINOPHIL NFR BLD AUTO: 1.2 %
GLUCOSE QUALITATIVE U: NEGATIVE
HCT VFR BLD CALC: 39.9 %
HGB BLD-MCNC: 13.1 G/DL
HYALINE CASTS: 2 /LPF
IMM GRANULOCYTES NFR BLD AUTO: 0.4 %
KETONES URINE: NEGATIVE
LEUKOCYTE ESTERASE URINE: NEGATIVE
LYMPHOCYTES # BLD AUTO: 3.09 K/UL
LYMPHOCYTES NFR BLD AUTO: 31.7 %
MAN DIFF?: NORMAL
MCHC RBC-ENTMCNC: 29.4 PG
MCHC RBC-ENTMCNC: 32.8 GM/DL
MCV RBC AUTO: 89.5 FL
MICROSCOPIC-UA: NORMAL
MONOCYTES # BLD AUTO: 0.48 K/UL
MONOCYTES NFR BLD AUTO: 4.9 %
NEUTROPHILS # BLD AUTO: 5.98 K/UL
NEUTROPHILS NFR BLD AUTO: 61.4 %
NITRITE URINE: NEGATIVE
PH URINE: 6.5
PLATELET # BLD AUTO: 144 K/UL
PROTEIN URINE: NEGATIVE
RBC # BLD: 4.46 M/UL
RBC # FLD: 13 %
RED BLOOD CELLS URINE: 2 /HPF
SPECIFIC GRAVITY URINE: 1.01
SQUAMOUS EPITHELIAL CELLS: 1 /HPF
UROBILINOGEN URINE: NORMAL
WBC # FLD AUTO: 9.75 K/UL
WHITE BLOOD CELLS URINE: 1 /HPF

## 2021-07-19 ENCOUNTER — TRANSCRIPTION ENCOUNTER (OUTPATIENT)
Age: 36
End: 2021-07-19

## 2021-07-19 LAB — BACTERIA UR CULT: NORMAL

## 2021-07-22 ENCOUNTER — RX CHANGE (OUTPATIENT)
Age: 36
End: 2021-07-22

## 2021-08-30 ENCOUNTER — TRANSCRIPTION ENCOUNTER (OUTPATIENT)
Age: 36
End: 2021-08-30

## 2021-09-01 ENCOUNTER — TRANSCRIPTION ENCOUNTER (OUTPATIENT)
Age: 36
End: 2021-09-01

## 2021-09-07 ENCOUNTER — APPOINTMENT (OUTPATIENT)
Dept: DERMATOLOGY | Facility: CLINIC | Age: 36
End: 2021-09-07

## 2021-09-08 ENCOUNTER — APPOINTMENT (OUTPATIENT)
Dept: FAMILY MEDICINE | Facility: CLINIC | Age: 36
End: 2021-09-08
Payer: COMMERCIAL

## 2021-09-08 DIAGNOSIS — L50.1 IDIOPATHIC URTICARIA: ICD-10-CM

## 2021-09-08 DIAGNOSIS — K21.9 GASTRO-ESOPHAGEAL REFLUX DISEASE W/OUT ESOPHAGITIS: ICD-10-CM

## 2021-09-08 PROCEDURE — 99214 OFFICE O/P EST MOD 30 MIN: CPT | Mod: 95

## 2021-09-08 NOTE — HISTORY OF PRESENT ILLNESS
[Home] : at home, [unfilled] , at the time of the visit. [Other Location: e.g. Home (Enter Location, City,State)___] : at [unfilled] [Verbal consent obtained from patient] : the patient, [unfilled] [de-identified] : 37 yo female here for follow up to go over labs.\par \par Recent blood work reviewed showing hypertriglyceridemia. Patient had been on fish oils in the past, is no longer taking. \par \par H/o of CRP elevation in the past which she saw Rheum for. Hasn't followed up. We did repeat her CRP and it was 14, with new threshold being > 4. CRP is trending down but still elevated.\par \par Had a spot on her chest about a month ago but it went away eventually on it's own. \par \par Also complaining of burping a lot especially recently. Had a few days last month where she was having trouble eating solid foods. Had nausea and diarrhea, as well as pain in the upper stomach. Sometimes after eating she still feels hungry. Denying reflux of food coming up. Stomach is okay today, but still with lots of burping. Diarrhea has resolved. Still with some epigastric pain. No black stools or blood in stool. \par \par H/o thyroid nodule. Needs follow up US for stability.

## 2021-10-05 ENCOUNTER — RX CHANGE (OUTPATIENT)
Age: 36
End: 2021-10-05

## 2021-10-05 RX ORDER — PANTOPRAZOLE 40 MG/1
40 TABLET, DELAYED RELEASE ORAL DAILY
Qty: 30 | Refills: 0 | Status: DISCONTINUED | COMMUNITY
Start: 2021-09-08 | End: 2021-10-05

## 2021-10-07 ENCOUNTER — RESULT REVIEW (OUTPATIENT)
Age: 36
End: 2021-10-07

## 2021-10-07 ENCOUNTER — OUTPATIENT (OUTPATIENT)
Dept: OUTPATIENT SERVICES | Facility: HOSPITAL | Age: 36
LOS: 1 days | End: 2021-10-07

## 2021-10-07 ENCOUNTER — APPOINTMENT (OUTPATIENT)
Dept: ULTRASOUND IMAGING | Facility: CLINIC | Age: 36
End: 2021-10-07
Payer: COMMERCIAL

## 2021-10-07 ENCOUNTER — APPOINTMENT (OUTPATIENT)
Dept: MAMMOGRAPHY | Facility: CLINIC | Age: 36
End: 2021-10-07
Payer: COMMERCIAL

## 2021-10-07 PROCEDURE — 77067 SCR MAMMO BI INCL CAD: CPT | Mod: 26

## 2021-10-07 PROCEDURE — 76641 ULTRASOUND BREAST COMPLETE: CPT | Mod: 26,50

## 2021-10-07 PROCEDURE — 77063 BREAST TOMOSYNTHESIS BI: CPT | Mod: 26

## 2021-10-08 ENCOUNTER — APPOINTMENT (OUTPATIENT)
Dept: RHEUMATOLOGY | Facility: CLINIC | Age: 36
End: 2021-10-08
Payer: COMMERCIAL

## 2021-10-08 ENCOUNTER — NON-APPOINTMENT (OUTPATIENT)
Age: 36
End: 2021-10-08

## 2021-10-08 VITALS
HEART RATE: 72 BPM | DIASTOLIC BLOOD PRESSURE: 70 MMHG | SYSTOLIC BLOOD PRESSURE: 105 MMHG | OXYGEN SATURATION: 98 % | TEMPERATURE: 97.8 F | WEIGHT: 171 LBS | BODY MASS INDEX: 27.48 KG/M2 | HEIGHT: 66 IN

## 2021-10-08 DIAGNOSIS — G89.29 LOW BACK PAIN, UNSPECIFIED: ICD-10-CM

## 2021-10-08 DIAGNOSIS — M54.9 DORSALGIA, UNSPECIFIED: ICD-10-CM

## 2021-10-08 DIAGNOSIS — G89.29 DORSALGIA, UNSPECIFIED: ICD-10-CM

## 2021-10-08 DIAGNOSIS — M54.50 LOW BACK PAIN, UNSPECIFIED: ICD-10-CM

## 2021-10-08 DIAGNOSIS — D50.9 IRON DEFICIENCY ANEMIA, UNSPECIFIED: ICD-10-CM

## 2021-10-08 PROCEDURE — 99214 OFFICE O/P EST MOD 30 MIN: CPT | Mod: 25

## 2021-10-08 PROCEDURE — 36415 COLL VENOUS BLD VENIPUNCTURE: CPT

## 2021-10-10 PROBLEM — M54.9 BACK PAIN, CHRONIC: Status: ACTIVE | Noted: 2021-10-08

## 2021-10-10 NOTE — HISTORY OF PRESENT ILLNESS
[___ Month(s) Ago] : [unfilled] month(s) ago [Fatigue] : fatigue [Depression] : depression [Dry Eyes] : dry eyes [FreeTextEntry1] : Follow up: 10/8/21 \par \par 35 y/o F presents for follow up, last time she was seen two years ago ( Oct, 2019 ) and followed up  persistently elevated inflammatory markers both ESR and CRP with no underlying autoimmune disease identified.  \par She does not have psoriasis, has normal RF,CCP,negative SWAPNIL, Sjogrens negative. \par US negative for inflammatory arthritis, asymmetric enlargement of R median nerve suspicious for R carpal tunnel syndrome, referred for EMG but it never happened. \par Today  she reveals about chronic low back pain, since age 13 when she fell off the stairs,  found L2-3 disc bulge, has seen chiropractor in the past and had good response. pain not always, comes and goes. \par Denies hx of inflammatory eye disease ( uveitis or scleritis) no joint swelling/tendinitis or enthesitis. \par R knee pop \par left hip makes snapping sound \par fatigue, cant focus, brain fogs, started anxiety medication buspiron \par Gi issues, cant tolerate certain food. \par Most recent labs found iron deficiency \par \par \par \par \par Follow up: 10/1/19 \par Elevated inflammatory markers both ESR and CRP with no underlying autoimmune disease identified.  \par US negative for inflammatory arthritis, asymmetric enlargement of R median nerve suspicious for R carpal tunnel syndrome.\par Pt has negative specific test, has not yet made appt with Dr. Kendrick for EMG/NCT. \par Had sleep studies. \par She twisted left ankle about 10 days ago, when traveling in [place], went to local ER, told has normal xrays without fractures, trying to ice it and using Naproxen for pain control, states swelling and hematoma gone down. \par Has trouble to ambulate, comes on wheelchair accompanied by his boyfriend.\par \par \par Follow up: 8/29/19\par Elevated inflammatory markers both ESR and CRP with no underlying autoimmune disease. \par Found to have pulm nodules, saw pulm  Dr. Rodriguez who felt they were benign. Sleep study was recommended for sleep apnea, for  cervical lymphadenopathy no LAD on palpation to biopsy according to Dr. Marrero. \par Saw Neurology recommended autonomic testing. \par Pt continues to experiencing episodic flares of the fatigue mostly stress related, insomnia. \par With fatigue flares feels more body stiffness, has R wrist and thumb pain, worse during typing, intermittent tingling and numbness sensation of the thumb, ankle pain and left hip states unchanged. \par \par Follow up: 4/23/19\par Pt saw Hem: manual diff normal, repeat CBC no thrombocytosis\par No new complaints, labs negative, except moderately elevated ESR and CRP. \par Still with pruritus and intermittent urticaria, takes zyrtec to control symptoms. \par Denies fever, chills or cough.  \par Has not seen psychiatrist yet, very stressed out about many things, argues with BF. \par \par \par Initial consult: 4/1/19\par 33 yo female was referred by PMD Dr. Garcia for Rheum evaluation, pt has anxiety, fatigue and dizziness, symptoms started back  in June was on a plane and she felt nauseous and sick, felt  cold and sweaty and her knees went numb. She had tunnel vision and passed out for a few seconds. She went to the ED for this after landing in [place], work up has been negative. Had recurrent 4 similar episodes since then with dizziness,  no passing out since then.\par ROS: +  extreme fatigue, arthralgia and puffiness of her hands without dry skin, Raynaud;s disease or rash, but do reports full body itchiness recently. Can't sleep well. \par Dry eyes but no dry mouth, able to produce tears. \par She feels depressed since before a stressful deadline and she could not focus or function at all. \par Admitting to daily anxiety. Seeing a psychoanalyst weekly, but not seen psychiatrist. \par 20Ibs weight gain for 1 year.  \par Nausea \par Denies alopecia or malar rash.  \par Last week she is experiencing left sided should pain, thinks due to sleeping in wrong position\par R knee snapping sounds and sometimes ankles , knees swells up if kneels \par Has herniated L spine disc L1-L3 but denies chronic low back pain. \par Left groin small mobile Lymph node. 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 \par Denies personal or family hx of autoimmune disease. \par She had extensive labs done recently which showed mildly elevated ESR and CRP, negative RF.CCP, SWAPNIL. \par CBC with lymphocytosis and thrombocytosis: pending Hem consult.  [Anorexia] : no anorexia [Weight Loss] : no weight loss [Malaise] : no malaise [Fever] : no fever [Chills] : no chills [Malar Facial Rash] : no malar facial rash [Skin Lesions] : no lesions [Skin Nodules] : no skin nodules [Oral Ulcers] : no oral ulcers [Cough] : no cough [Dry Mouth] : no dry mouth [Dysphonia] : no dysphonia [Dysphagia] : no dysphagia [Arthralgias] : no arthralgias [Joint Swelling] : no joint swelling [Joint Warmth] : no joint warmth [Joint Deformity] : no joint deformity [Decreased ROM] : no decreased range of motion [Morning Stiffness] : no morning stiffness [Falls] : no falls [Difficulty Standing] : no difficulty standing [Difficulty Walking] : no difficulty walking [Muscle Weakness] : no muscle weakness [Muscle Spasms] : no muscle spasms [Muscle Cramping] : no muscle cramping [Visual Changes] : no visual changes [Eye Pain] : no eye pain [Eye Redness] : no eye redness

## 2021-10-10 NOTE — PHYSICAL EXAM
[General Appearance - Alert] : alert [General Appearance - In No Acute Distress] : in no acute distress [General Appearance - Well Nourished] : well nourished [General Appearance - Well Developed] : well developed [Sclera] : the sclera and conjunctiva were normal [Optic Disc Abnormality] : the optic disc were normal in size and color [Hearing Threshold Finger Rub Not Randall] : hearing was normal [Examination Of The Oral Cavity] : the lips and gums were normal [Oropharynx] : the oropharynx was normal [Neck Appearance] : the appearance of the neck was normal [Thyroid Diffuse Enlargement] : the thyroid was not enlarged [Thyroid Nodule] : there were no palpable thyroid nodules [Auscultation Breath Sounds / Voice Sounds] : lungs were clear to auscultation bilaterally [Heart Rate And Rhythm] : heart rate was normal and rhythm regular [Heart Sounds] : normal S1 and S2 [Heart Sounds Gallop] : no gallops [Murmurs] : no murmurs [Heart Sounds Pericardial Friction Rub] : no pericardial rub [Edema] : there was no peripheral edema [Veins - Varicosity Changes] : there were no varicosital changes [Inguinal Lymph Nodes Enlarged Bilaterally] : inguinal [Abnormal Walk] : normal gait [Nail Clubbing] : no clubbing  or cyanosis of the fingernails [Musculoskeletal - Swelling] : no joint swelling seen [Motor Tone] : muscle strength and tone were normal [Skin Color & Pigmentation] : normal skin color and pigmentation [] : no rash [Skin Lesions] : no skin lesions [Oriented To Time, Place, And Person] : oriented to person, place, and time [No CVA Tenderness] : no ~M costovertebral angle tenderness [No Spinal Tenderness] : no spinal tenderness [FreeTextEntry1] : negative Schober and DILIA

## 2021-10-10 NOTE — ASSESSMENT
[FreeTextEntry1] : 35 yo female with stress induced  fatigue , urticaria  depression  She has elevated ESR and CRP since 2019 ( ESR 50-70 range and CRP ) with normal other extensive Rheum labs and normal b/l hand and wrist Xray/ US with no evidence of inflammatory changes/erosions, synovitis or tenosynovitis, suspected R CTS based on US, she us using splint  but no EMGs done.  \par Persistently elevated inflammatory markers ( both ESR and CRP) for unknown etiology since 2019 \par Today she reveled hx of low back pain since accident, but not quite inflammatory pain in nature, negative Schober and DILIA. \par Has new iron deficiency anemia and chronic GI issues - hence r/u celiac disease. \par We talked today to check labs to rule out SpA/ sacroiliitis, obtain Xray SI joint,  Th/lumbar spine and HLA B27 \par negative POC urine pregnancy test today ( checked prior to xray)\par \par CTS: recommended to make appt with Dr. Hiren CASAREZ for EMG/NCS and c/w  use wrist splint at night, avoid repetitive wrist activities and if EMG confirms diagnoses we talked about CTS steroid injection\par \par Previously reported  intermittent urticaria and pruritus: not an active issue today, well controlled on zyrtec. \par All labs done during the visit today.\par \par f/u in 2 months or earlier

## 2021-10-10 NOTE — REVIEW OF SYSTEMS
[Feeling Tired] : feeling tired [Recent Weight Gain (___ Lbs)] : recent [unfilled] ~Ulb weight gain [Dry Eyes] : dryness of the eyes [Arthralgias] : arthralgias [Joint Pain] : joint pain [Joint Swelling] : joint swelling [Itching] : itching [Dizziness] : dizziness [Limb Weakness] : limb weakness [Sleep Disturbances] : sleep disturbances [Anxiety] : anxiety [Depression] : depression [Swollen Glands] : swollen glands [Fever] : no fever [Feeling Poorly] : not feeling poorly [Eye Pain] : no eye pain [Red Eyes] : eyes not red [Eyesight Problems] : no eyesight problems [Discharge From Eyes] : no purulent discharge from the eyes [Eyes Itch] : no itching of the eyes [Nosebleeds] : no nosebleeds [Heart Rate Is Slow] : the heart rate was not slow [Heart Rate Is Fast] : the heart rate was not fast [Chest Pain] : no chest pain [Palpitations] : no palpitations [Leg Claudication] : no intermittent leg claudication [Lower Ext Edema] : no lower extremity edema [Shortness Of Breath] : no shortness of breath [Wheezing] : no wheezing [Cough] : no cough [SOB on Exertion] : no shortness of breath during exertion [Orthopnea] : no orthopnea [Abdominal Pain] : no abdominal pain [Constipation] : no constipation [Vomiting] : no vomiting [Diarrhea] : no diarrhea [Heartburn] : no heartburn [Melena] : no melena [Dysuria] : no dysuria [Pelvic Pain] : no pelvic pain [Vaginal Discharge] : no vaginal discharge [Joint Stiffness] : no joint stiffness [Limb Pain] : no limb pain [Limb Swelling] : no limb swelling [Skin Lesions] : no skin lesions [Skin Wound] : no skin wound [Confused] : no confusion [Convulsions] : no convulsions [Difficulty Walking] : no difficulty walking [Suicidal] : not suicidal [Change In Personality] : no personality change [Emotional Problems] : no emotional problems [Proptosis] : no proptosis [Hot Flashes] : no hot flashes [Muscle Weakness] : no muscle weakness [Easy Bruising] : no tendency for easy bruising [Easy Bleeding] : no tendency for easy bleeding [Swollen Glands In The Neck] : no swollen glands in the neck [FreeTextEntry4] : left ear ringing occasionally  [FreeTextEntry7] : nausea  [de-identified] : hopeless thoughts.  [de-identified] : left groin

## 2021-10-12 ENCOUNTER — NON-APPOINTMENT (OUTPATIENT)
Age: 36
End: 2021-10-12

## 2021-10-12 ENCOUNTER — RESULT REVIEW (OUTPATIENT)
Age: 36
End: 2021-10-12

## 2021-10-12 ENCOUNTER — OUTPATIENT (OUTPATIENT)
Dept: OUTPATIENT SERVICES | Facility: HOSPITAL | Age: 36
LOS: 1 days | End: 2021-10-12

## 2021-10-12 ENCOUNTER — APPOINTMENT (OUTPATIENT)
Dept: ULTRASOUND IMAGING | Facility: CLINIC | Age: 36
End: 2021-10-12
Payer: COMMERCIAL

## 2021-10-12 PROCEDURE — 76536 US EXAM OF HEAD AND NECK: CPT | Mod: 26

## 2021-10-18 ENCOUNTER — RESULT REVIEW (OUTPATIENT)
Age: 36
End: 2021-10-18

## 2021-10-18 ENCOUNTER — TRANSCRIPTION ENCOUNTER (OUTPATIENT)
Age: 36
End: 2021-10-18

## 2021-10-18 ENCOUNTER — OUTPATIENT (OUTPATIENT)
Dept: OUTPATIENT SERVICES | Facility: HOSPITAL | Age: 36
LOS: 1 days | End: 2021-10-18
Payer: COMMERCIAL

## 2021-10-18 PROCEDURE — 72202 X-RAY EXAM SI JOINTS 3/> VWS: CPT | Mod: 26

## 2021-10-18 PROCEDURE — 72074 X-RAY EXAM THORAC SPINE4/>VW: CPT | Mod: 26

## 2021-10-21 ENCOUNTER — NON-APPOINTMENT (OUTPATIENT)
Age: 36
End: 2021-10-21

## 2021-10-21 ENCOUNTER — APPOINTMENT (OUTPATIENT)
Dept: PHYSICAL MEDICINE AND REHAB | Facility: CLINIC | Age: 36
End: 2021-10-21
Payer: COMMERCIAL

## 2021-10-21 VITALS — RESPIRATION RATE: 17 BRPM | WEIGHT: 170 LBS | BODY MASS INDEX: 27.32 KG/M2 | HEIGHT: 66 IN

## 2021-10-21 DIAGNOSIS — G89.29 PAIN IN LEFT HAND: ICD-10-CM

## 2021-10-21 DIAGNOSIS — G89.29 PAIN IN RIGHT HAND: ICD-10-CM

## 2021-10-21 DIAGNOSIS — M79.641 PAIN IN RIGHT HAND: ICD-10-CM

## 2021-10-21 DIAGNOSIS — R27.8 OTHER LACK OF COORDINATION: ICD-10-CM

## 2021-10-21 DIAGNOSIS — M79.642 PAIN IN LEFT HAND: ICD-10-CM

## 2021-10-21 PROCEDURE — 99214 OFFICE O/P EST MOD 30 MIN: CPT | Mod: 95

## 2021-10-21 NOTE — ASSESSMENT
[FreeTextEntry1] : \par PLAN AND RECOMMENDATIONS :\par \par We discussed differential diagnosis and clinical impression\par agree with EMG rule out CTS assoc with inflammatory markers \par \par Recommend\par .symptomatic care and support\par  medications as per rheum \par  imaging NR \par \par  hydrotherapy /heat / cold for pain\par  continue erg\par \par  relative rest and avoidance of painful activity where possible \par  increasing activity as discussed \par  return for EMG \par Information given to patient about EMG and Nerve Conduction Study Examination including  planning, differential diagnosis to rule in /rule out ,duration of the test ,precautions (if patient on blood thinner.has bleeding disorder or  pace maker device etc -still possible to undergo with care), side effects(benign-limited to short term bruising and discomfort/pain)  \par The protocol of temp checks upon arrival ,disinfection procedure of waiting room and the lab explained- reassured. \par All questions answered. \par Patient instructed to book appointment upon conclusion of appointment\par \par Information sheet ' Answers to your Questions on EMG " forwarded to patient to read prior to testing, with further information about training,background and the procedure itself .\par \par Total clinician time on the date of this encounter was at least 45 minutes- including\par \par 1 preparing to see the patient and review of prior notes, tests and other records \par 2 obtaining and reviewing and/ or confirming the history\par 3 performing a medically necessary, pertinent  and appropriate examination via telehealth which is also more challenging \par 4 ordering EMG and explaining the pros and cons and side effects of test \par 5 explaining the diagnosis or differential to the patient rule out CTS \par 6 communicating with other physicians or providers before during or after the visit.and same with after EMG \par \par

## 2021-10-21 NOTE — PHYSICAL EXAM
[Normal] : Oriented to person, place, and time, insight and judgement were intact and the affect was normal [de-identified] : MCP puffyness opens digits fine

## 2021-10-21 NOTE — HISTORY OF PRESENT ILLNESS
[Home] : at home, [unfilled] , at the time of the visit. [Medical Office: ([facility])___] : at the medical office located in  [Verbal consent obtained from patient] : the patient, [unfilled] [FreeTextEntry1] : Ms. MELISSA TRINIDAD is a very pleasant 36 year female  PMH + inflammatory markers ESR and CRP  who is  seen for evaluation of bilateral hand symptoms   that has been ongoing for 3 years   without any specific injury or inciting event. The pain is located primarily index and thumb R > L  intermittent in nature and described as swollen . The pain is rated as 0/10 during  today s visit . The patient's symptoms are aggravated by use she has trouble opening jars or holding phone/ typing/ sitting   and alleviated by ice/ rest/ heat  . The patient denies any night pain,  marilynn numbness/tingling, other  weakness, or bowel/bladder dysfunction. The patient has no other complaints at this time.\par she is R handed \par she gets LBP from time to time and arthralgias \par \par she works as a writer \par

## 2021-10-21 NOTE — REVIEW OF SYSTEMS
[Patient Intake Form Reviewed] : Patient intake form was reviewed [Fatigue] : fatigue [Lower Ext Edema] : no lower extremity edema [Joint Pain] : joint pain [Joint Stiffness] : joint stiffness [Muscle Pain] : muscle pain [Muscle Weakness] : muscle weakness [Difficulty Walking] : no difficulty walking [Negative] : Heme/Lymph

## 2021-10-21 NOTE — CONSULT LETTER
[FreeTextEntry1] : Dear Dr. AMOS JENSEN  ,  DR ALAINA SCHMITT\par \par I had the pleasure of evaluating your patient, MELISSA TRINIDAD .\par \par Thank you very much for allowing me to participate in the care of this patient. If you have any questions, please do not hesitate to contact me. \par \par Sincerely, \par Hiren Kendrick MD \par \par ABPMR Board Certified in Physical Medicine and Rehabilitation\par Certified Fellow of AANEM (Neuromuscular and Electrodiagnostic Medicine)\par Subspecialty certified in Sports Medicine (ABPMR)\par \par  of Physical Medicine and Rehabilitation\par [facility]\par [facility]\par \par

## 2021-11-02 ENCOUNTER — APPOINTMENT (OUTPATIENT)
Dept: PHYSICAL MEDICINE AND REHAB | Facility: CLINIC | Age: 36
End: 2021-11-02
Payer: COMMERCIAL

## 2021-11-02 DIAGNOSIS — R29.898 OTHER SYMPTOMS AND SIGNS INVOLVING THE MUSCULOSKELETAL SYSTEM: ICD-10-CM

## 2021-11-02 DIAGNOSIS — M25.542 PAIN IN JOINTS OF RIGHT HAND: ICD-10-CM

## 2021-11-02 DIAGNOSIS — M25.641 STIFFNESS OF RIGHT HAND, NOT ELSEWHERE CLASSIFIED: ICD-10-CM

## 2021-11-02 DIAGNOSIS — M25.541 PAIN IN JOINTS OF RIGHT HAND: ICD-10-CM

## 2021-11-02 DIAGNOSIS — M79.89 OTHER SPECIFIED SOFT TISSUE DISORDERS: ICD-10-CM

## 2021-11-02 DIAGNOSIS — M79.643 PAIN IN UNSPECIFIED HAND: ICD-10-CM
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
## 2021-11-02 DIAGNOSIS — G89.29 PAIN IN UNSPECIFIED HAND: ICD-10-CM

## 2021-11-02 PROCEDURE — 95886 MUSC TEST DONE W/N TEST COMP: CPT

## 2021-11-02 PROCEDURE — 95911 NRV CNDJ TEST 9-10 STUDIES: CPT

## 2021-11-03 ENCOUNTER — RX CHANGE (OUTPATIENT)
Age: 36
End: 2021-11-03

## 2021-12-30 ENCOUNTER — RX CHANGE (OUTPATIENT)
Age: 36
End: 2021-12-30

## 2022-02-03 ENCOUNTER — RX RENEWAL (OUTPATIENT)
Age: 37
End: 2022-02-03

## 2022-04-11 ENCOUNTER — APPOINTMENT (OUTPATIENT)
Dept: ULTRASOUND IMAGING | Facility: CLINIC | Age: 37
End: 2022-04-11
Payer: COMMERCIAL

## 2022-04-11 ENCOUNTER — RESULT REVIEW (OUTPATIENT)
Age: 37
End: 2022-04-11

## 2022-04-11 ENCOUNTER — OUTPATIENT (OUTPATIENT)
Dept: OUTPATIENT SERVICES | Facility: HOSPITAL | Age: 37
LOS: 1 days | End: 2022-04-11

## 2022-04-11 PROCEDURE — 76642 ULTRASOUND BREAST LIMITED: CPT | Mod: 26,RT

## 2022-04-24 ENCOUNTER — NON-APPOINTMENT (OUTPATIENT)
Age: 37
End: 2022-04-24

## 2022-06-07 ENCOUNTER — RX RENEWAL (OUTPATIENT)
Age: 37
End: 2022-06-07

## 2022-06-28 NOTE — HISTORY OF PRESENT ILLNESS
[de-identified] : 32 yo female here for CPE. \par \par Also had an episode of fainting on a plane a week ago. She was on a red eye sleeping and woke up. She felt nauseous and sick. She got cold and sweaty and her knees went numb. She had tunnel vision and passed out for a few seconds. Denying seizure like activity. No loss of urine or stool. No head trauma. This has happened one time before in her early 20s and was similar. She had eaten tuna and fruit snacks a little bit before. She was also drinking a lot of water. Stating she was in [facility] right before and at a high elevation. She went to the ED for this after landing in [place]. They did EKG, labs, D dimer, CT brain, and CTA and all were negative. Feeling better today, but still struggling with feeling unstable at times when walking. Also with some ringing and discomfort at times in her ears.
eus

## 2022-07-11 ENCOUNTER — TRANSCRIPTION ENCOUNTER (OUTPATIENT)
Age: 37
End: 2022-07-11

## 2022-07-22 RX ORDER — FERROUS GLUCONATE 240(27)MG
240 (27 FE) TABLET ORAL
Qty: 90 | Refills: 2 | Status: ACTIVE | COMMUNITY
Start: 2021-07-07 | End: 1900-01-01

## 2022-08-17 NOTE — ED ADULT TRIAGE NOTE - NS ED NURSE DIRECT TO ROOM YN
Group Therapy Documentation    PATIENT'S NAME: Tamra Jaimes  MRN:   [id number]  :   2008  ACCT. NUMBER: [id number]  DATE OF SERVICE: 22  START TIME: 12:00 PM  END TIME:  1:00 PM  FACILITATOR(S): Vinicius Womack  TOPIC: Child/Adol Group Therapy  Number of patients attending the group:  6  Group Length:  1 Hours  Interactive Complexity: Yes, visit entailed Interactive Complexity evidenced by:  -The need to manage maladaptive communication (related to, e.g., high anxiety, high reactivity, repeated questions, or disagreement) among participants that complicates delivery of care    Summary of Group / Topics Discussed:    Therapeutic Instrument Playing/Singing:    Objective(s):    Create an environment of peer support within group    Ease tension within group and individuals    Lower the stress response to social interactions    Creative play with adults and peers    Increase confidence     Improve group and individual organization    Support verbal and non-verbal communication    Exercise active listening skills    Expected therapeutic outcome(s):    Increased self-confidence     Increased group cohesion     Increased self- awareness    To generalize communication and listening skills outside of therapy and with peers    Therapeutic outcome(s) measured by:    Therapists  questioning    Patients  report of emotional state before and after intervention.    Patient participation    Documentation in the medical record    Weekly report to the treatment team    Music Therapy Overview:  Music Therapy is the clinical and evidence-based use of music interventions to accomplish individualized goals within a therapeutic relationship by a credentialed professional (ANIYA).  Music therapy in the adolescent day treatment setting incorporates a variety of music interventions and musical interaction designed for patients to learn new coping skills, identify and express emotion, develop social skills, and develop  intrapersonal understanding. Music therapy in this context is meant to help patients develop relationships and address issues that they may not be able to using words alone. In addition, music therapy sessions are designed to educate patients about mental health diagnoses and symptom management.       Group Attendance:  Attended group session  Interactive Complexity: Yes, visit entailed Interactive Complexity evidenced by:  -The need to manage maladaptive communication (related to, e.g., high anxiety, high reactivity, repeated questions, or disagreement) among participants that complicates delivery of care    Patient's response to the group topic/interactions:  cooperative with task    Patient appeared to be Actively participating, Attentive and Engaged.       Client specific details:  Positively engaged in therapeutic singing.         No

## 2022-09-01 ENCOUNTER — APPOINTMENT (OUTPATIENT)
Dept: FAMILY MEDICINE | Facility: CLINIC | Age: 37
End: 2022-09-01

## 2022-09-01 VITALS
OXYGEN SATURATION: 98 % | RESPIRATION RATE: 16 BRPM | HEART RATE: 73 BPM | DIASTOLIC BLOOD PRESSURE: 76 MMHG | TEMPERATURE: 97.9 F | WEIGHT: 176 LBS | BODY MASS INDEX: 28.28 KG/M2 | HEIGHT: 66 IN | SYSTOLIC BLOOD PRESSURE: 114 MMHG

## 2022-09-01 DIAGNOSIS — N64.4 MASTODYNIA: ICD-10-CM

## 2022-09-01 DIAGNOSIS — R30.0 DYSURIA: ICD-10-CM

## 2022-09-01 DIAGNOSIS — G56.01 CARPAL TUNNEL SYNDROME, RIGHT UPPER LIMB: ICD-10-CM

## 2022-09-01 DIAGNOSIS — Z01.00 ENCOUNTER FOR EXAMINATION OF EYES AND VISION W/OUT ABNORMAL FINDINGS: ICD-10-CM

## 2022-09-01 PROCEDURE — 99395 PREV VISIT EST AGE 18-39: CPT | Mod: 25

## 2022-09-01 PROCEDURE — 99214 OFFICE O/P EST MOD 30 MIN: CPT | Mod: 25

## 2022-09-01 NOTE — ASSESSMENT
[FreeTextEntry1] : CPE- Well exam, comprehensive labs ordered. STD panel declined. Up to date with PAP. Follow up labs.

## 2022-09-01 NOTE — HISTORY OF PRESENT ILLNESS
[de-identified] : 36 yo female here for CPE.\par \par H/o of CRP elevation in the past which she saw Rheum for. Was sent for eval with PMR as well for EMG/NCS which was normal. Needs repeat ESR/CRp. \par \par H/o thyroid nodule which was stable. Needs follow up US for stability. \par \par H/o breast nodule and pain. Needs repeat mammo and sono. \par \par Recently having UTI symptoms for the last month and a half. Hasn't taken antibiotics. \par \par Also with lung nodules and worsening snoring. Has had sleep study in the past that was normal in 2019.

## 2022-09-01 NOTE — HEALTH RISK ASSESSMENT
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

## 2022-09-02 LAB
25(OH)D3 SERPL-MCNC: 41.8 NG/ML
ALBUMIN SERPL ELPH-MCNC: 4.2 G/DL
ALP BLD-CCNC: 88 U/L
ALT SERPL-CCNC: 27 U/L
ANION GAP SERPL CALC-SCNC: 14 MMOL/L
APPEARANCE: CLEAR
AST SERPL-CCNC: 24 U/L
BACTERIA: NEGATIVE
BASOPHILS # BLD AUTO: 0.04 K/UL
BASOPHILS NFR BLD AUTO: 0.5 %
BILIRUB SERPL-MCNC: 0.2 MG/DL
BILIRUBIN URINE: NEGATIVE
BLOOD URINE: NEGATIVE
BUN SERPL-MCNC: 12 MG/DL
CALCIUM SERPL-MCNC: 9.4 MG/DL
CHLORIDE SERPL-SCNC: 102 MMOL/L
CHOLEST SERPL-MCNC: 252 MG/DL
CO2 SERPL-SCNC: 24 MMOL/L
COLOR: NORMAL
CREAT SERPL-MCNC: 0.76 MG/DL
CRP SERPL-MCNC: 9 MG/L
EGFR: 102 ML/MIN/1.73M2
EOSINOPHIL # BLD AUTO: 0.1 K/UL
EOSINOPHIL NFR BLD AUTO: 1.1 %
ERYTHROCYTE [SEDIMENTATION RATE] IN BLOOD BY WESTERGREN METHOD: 39 MM/HR
ESTIMATED AVERAGE GLUCOSE: 114 MG/DL
FERRITIN SERPL-MCNC: 57 NG/ML
FOLATE SERPL-MCNC: >20 NG/ML
GLUCOSE QUALITATIVE U: NEGATIVE
GLUCOSE SERPL-MCNC: 92 MG/DL
HBA1C MFR BLD HPLC: 5.6 %
HCT VFR BLD CALC: 42.3 %
HDLC SERPL-MCNC: 59 MG/DL
HGB BLD-MCNC: 14 G/DL
HYALINE CASTS: 0 /LPF
IMM GRANULOCYTES NFR BLD AUTO: 0.3 %
IRON SATN MFR SERPL: 19 %
IRON SERPL-MCNC: 59 UG/DL
KETONES URINE: NEGATIVE
LDLC SERPL CALC-MCNC: 168 MG/DL
LEUKOCYTE ESTERASE URINE: NEGATIVE
LYMPHOCYTES # BLD AUTO: 3.45 K/UL
LYMPHOCYTES NFR BLD AUTO: 39.3 %
MAN DIFF?: NORMAL
MCHC RBC-ENTMCNC: 29.5 PG
MCHC RBC-ENTMCNC: 33.1 GM/DL
MCV RBC AUTO: 89.1 FL
MICROSCOPIC-UA: NORMAL
MONOCYTES # BLD AUTO: 0.42 K/UL
MONOCYTES NFR BLD AUTO: 4.8 %
NEUTROPHILS # BLD AUTO: 4.73 K/UL
NEUTROPHILS NFR BLD AUTO: 54 %
NITRITE URINE: NEGATIVE
NONHDLC SERPL-MCNC: 193 MG/DL
PH URINE: 6.5
PLATELET # BLD AUTO: 419 K/UL
POTASSIUM SERPL-SCNC: 4.8 MMOL/L
PROT SERPL-MCNC: 7.4 G/DL
PROTEIN URINE: NEGATIVE
RBC # BLD: 4.75 M/UL
RBC # FLD: 12.3 %
RED BLOOD CELLS URINE: 1 /HPF
SODIUM SERPL-SCNC: 139 MMOL/L
SPECIFIC GRAVITY URINE: 1
SQUAMOUS EPITHELIAL CELLS: 0 /HPF
T4 FREE SERPL-MCNC: 1.3 NG/DL
TIBC SERPL-MCNC: 318 UG/DL
TRANSFERRIN SERPL-MCNC: 269 MG/DL
TRIGL SERPL-MCNC: 124 MG/DL
TSH SERPL-ACNC: 0.62 UIU/ML
UIBC SERPL-MCNC: 259 UG/DL
UROBILINOGEN URINE: NORMAL
VIT B12 SERPL-MCNC: 871 PG/ML
WBC # FLD AUTO: 8.77 K/UL
WHITE BLOOD CELLS URINE: 0 /HPF

## 2022-09-06 LAB — BACTERIA UR CULT: NORMAL

## 2022-09-07 ENCOUNTER — NON-APPOINTMENT (OUTPATIENT)
Age: 37
End: 2022-09-07

## 2022-09-07 ENCOUNTER — TRANSCRIPTION ENCOUNTER (OUTPATIENT)
Age: 37
End: 2022-09-07

## 2022-09-29 NOTE — DATA REVIEWED
[FreeTextEntry1] : 1.0 cm complex left thyroid cyst. Follow-up thyroid ultrasound in 12 months \par is recommended. \par \par 2.4 x 1.1 x 1.7 cm right level 2 lymph node, as described above. \par \par In addition, focused examination in the left supraclavicular region was \par performed as patient's request and revealed no visualized soft tissue \par abnormality. \par \par Recommend follow-up ultrasound in 12 months. \par \par  wheelchair

## 2022-10-20 ENCOUNTER — NON-APPOINTMENT (OUTPATIENT)
Age: 37
End: 2022-10-20

## 2022-10-21 RX ORDER — PANTOPRAZOLE 40 MG/1
40 TABLET, DELAYED RELEASE ORAL
Qty: 90 | Refills: 0 | Status: ACTIVE | COMMUNITY
Start: 2022-10-21 | End: 1900-01-01

## 2022-11-10 NOTE — ED PROVIDER NOTE - NS ED MD EM SELECTION
Parking placard form completed in office, original provided to pt and daughter, copy sent to scanning dept. [id number] Comprehensive

## 2022-11-29 ENCOUNTER — TRANSCRIPTION ENCOUNTER (OUTPATIENT)
Age: 37
End: 2022-11-29

## 2022-12-02 ENCOUNTER — RESULT REVIEW (OUTPATIENT)
Age: 37
End: 2022-12-02

## 2022-12-02 ENCOUNTER — APPOINTMENT (OUTPATIENT)
Dept: ULTRASOUND IMAGING | Facility: CLINIC | Age: 37
End: 2022-12-02

## 2022-12-02 ENCOUNTER — OUTPATIENT (OUTPATIENT)
Dept: OUTPATIENT SERVICES | Facility: HOSPITAL | Age: 37
LOS: 1 days | End: 2022-12-02

## 2022-12-02 PROCEDURE — 76641 ULTRASOUND BREAST COMPLETE: CPT | Mod: 26,50

## 2022-12-02 PROCEDURE — 76536 US EXAM OF HEAD AND NECK: CPT | Mod: 26

## 2022-12-05 ENCOUNTER — TRANSCRIPTION ENCOUNTER (OUTPATIENT)
Age: 37
End: 2022-12-05

## 2023-02-02 ENCOUNTER — RX RENEWAL (OUTPATIENT)
Age: 38
End: 2023-02-02

## 2023-05-15 ENCOUNTER — RX RENEWAL (OUTPATIENT)
Age: 38
End: 2023-05-15

## 2023-06-21 ENCOUNTER — NON-APPOINTMENT (OUTPATIENT)
Age: 38
End: 2023-06-21

## 2023-06-22 ENCOUNTER — TRANSCRIPTION ENCOUNTER (OUTPATIENT)
Age: 38
End: 2023-06-22

## 2023-07-06 ENCOUNTER — NON-APPOINTMENT (OUTPATIENT)
Age: 38
End: 2023-07-06

## 2023-07-06 ENCOUNTER — APPOINTMENT (OUTPATIENT)
Dept: FAMILY MEDICINE | Facility: CLINIC | Age: 38
End: 2023-07-06
Payer: MEDICAID

## 2023-07-06 VITALS
OXYGEN SATURATION: 98 % | DIASTOLIC BLOOD PRESSURE: 76 MMHG | TEMPERATURE: 97.2 F | BODY MASS INDEX: 26.68 KG/M2 | SYSTOLIC BLOOD PRESSURE: 108 MMHG | HEART RATE: 85 BPM | HEIGHT: 66 IN | WEIGHT: 166 LBS

## 2023-07-06 DIAGNOSIS — D64.9 ANEMIA, UNSPECIFIED: ICD-10-CM

## 2023-07-06 DIAGNOSIS — T78.40XA ALLERGY, UNSPECIFIED, INITIAL ENCOUNTER: ICD-10-CM

## 2023-07-06 DIAGNOSIS — Z00.00 ENCOUNTER FOR GENERAL ADULT MEDICAL EXAMINATION W/OUT ABNORMAL FINDINGS: ICD-10-CM

## 2023-07-06 DIAGNOSIS — R91.8 OTHER NONSPECIFIC ABNORMAL FINDING OF LUNG FIELD: ICD-10-CM

## 2023-07-06 DIAGNOSIS — R52 OTHER COMPLICATIONS OF THE PUERPERIUM, NOT ELSEWHERE CLASSIFIED: ICD-10-CM

## 2023-07-06 PROCEDURE — 93000 ELECTROCARDIOGRAM COMPLETE: CPT

## 2023-07-06 PROCEDURE — 99213 OFFICE O/P EST LOW 20 MIN: CPT | Mod: 25

## 2023-07-06 PROCEDURE — 36415 COLL VENOUS BLD VENIPUNCTURE: CPT

## 2023-07-06 PROCEDURE — 99396 PREV VISIT EST AGE 40-64: CPT | Mod: 25

## 2023-07-06 NOTE — ASSESSMENT
[FreeTextEntry1] : CPE- Well exam, comprehensive labs ordered. STD panel declined. Up to date with PAP. Follow up labs, drawn today. Mammo script provided. EKG stable from 2018.

## 2023-07-06 NOTE — HISTORY OF PRESENT ILLNESS
[de-identified] : 39 yo female here for CPE. \par \par Pt is 6 weeks post partum. Complicated history with liver hematoma, anemia, elevated platelets, preeclampsia. Last visit with GI was 6/14. Hg was up to 11. Hematoma was last 13 cm, with normal LFTs at that time. Some pain still 2/2 liver hematoma. Next liver US is next week. GI appt in 2 weeks. Had 6 week postpartum visit last week and all was okay.\par \par H/o lung nodules which are benign appearing on CT in 2019. Needs follow up CT which was recommended.

## 2023-07-06 NOTE — HEALTH RISK ASSESSMENT
[Fair] :  ~his/her~ mood as fair [0] : 2) Feeling down, depressed, or hopeless: Not at all (0) [PHQ-2 Negative - No further assessment needed] : PHQ-2 Negative - No further assessment needed [HIV test declined] : HIV test declined [Hepatitis C test declined] : Hepatitis C test declined [With Family] : lives with family [Employed] : employed [Significant Other] : lives with significant other [# Of Children ___] : has [unfilled] children [[id number]] : 0 [de-identified] : write,

## 2023-07-07 DIAGNOSIS — R94.6 ABNORMAL RESULTS OF THYROID FUNCTION STUDIES: ICD-10-CM

## 2023-07-07 LAB
25(OH)D3 SERPL-MCNC: 27 NG/ML
ALBUMIN SERPL ELPH-MCNC: 4.3 G/DL
ALP BLD-CCNC: 97 U/L
ALT SERPL-CCNC: 11 U/L
ANION GAP SERPL CALC-SCNC: 13 MMOL/L
APPEARANCE: CLEAR
AST SERPL-CCNC: 13 U/L
BACTERIA: NEGATIVE /HPF
BILIRUB SERPL-MCNC: 0.2 MG/DL
BILIRUBIN URINE: NEGATIVE
BLOOD URINE: ABNORMAL
BUN SERPL-MCNC: 12 MG/DL
CALCIUM SERPL-MCNC: 9.5 MG/DL
CAST: 0 /LPF
CHLORIDE SERPL-SCNC: 103 MMOL/L
CHOLEST SERPL-MCNC: 248 MG/DL
CO2 SERPL-SCNC: 23 MMOL/L
COLOR: ABNORMAL
CREAT SERPL-MCNC: 0.83 MG/DL
CRP SERPL-MCNC: 9 MG/L
EGFR: 91 ML/MIN/1.73M2
EPITHELIAL CELLS: 2 /HPF
ESTIMATED AVERAGE GLUCOSE: 100 MG/DL
FERRITIN SERPL-MCNC: 46 NG/ML
FOLATE SERPL-MCNC: 10.9 NG/ML
GLUCOSE QUALITATIVE U: NEGATIVE MG/DL
GLUCOSE SERPL-MCNC: 93 MG/DL
HBA1C MFR BLD HPLC: 5.1 %
HDLC SERPL-MCNC: 58 MG/DL
IRON SATN MFR SERPL: 14 %
IRON SERPL-MCNC: 47 UG/DL
KETONES URINE: NEGATIVE MG/DL
LDLC SERPL CALC-MCNC: 166 MG/DL
LEUKOCYTE ESTERASE URINE: ABNORMAL
MICROSCOPIC-UA: NORMAL
NITRITE URINE: NEGATIVE
NONHDLC SERPL-MCNC: 190 MG/DL
PH URINE: 6
POTASSIUM SERPL-SCNC: 4.2 MMOL/L
PROT SERPL-MCNC: 7.4 G/DL
PROTEIN URINE: 30 MG/DL
RED BLOOD CELLS URINE: 104 /HPF
SODIUM SERPL-SCNC: 139 MMOL/L
SPECIFIC GRAVITY URINE: 1.01
T4 FREE SERPL-MCNC: 1 NG/DL
TIBC SERPL-MCNC: 336 UG/DL
TRANSFERRIN SERPL-MCNC: 268 MG/DL
TRIGL SERPL-MCNC: 117 MG/DL
TSH SERPL-ACNC: 0.2 UIU/ML
UIBC SERPL-MCNC: 289 UG/DL
UROBILINOGEN URINE: 0.2 MG/DL
VIT B12 SERPL-MCNC: 553 PG/ML
WHITE BLOOD CELLS URINE: 6 /HPF

## 2023-07-17 ENCOUNTER — TRANSCRIPTION ENCOUNTER (OUTPATIENT)
Age: 38
End: 2023-07-17

## 2023-07-20 ENCOUNTER — NON-APPOINTMENT (OUTPATIENT)
Age: 38
End: 2023-07-20

## 2023-07-20 ENCOUNTER — TRANSCRIPTION ENCOUNTER (OUTPATIENT)
Age: 38
End: 2023-07-20

## 2023-07-21 ENCOUNTER — TRANSCRIPTION ENCOUNTER (OUTPATIENT)
Age: 38
End: 2023-07-21

## 2023-07-23 ENCOUNTER — NON-APPOINTMENT (OUTPATIENT)
Age: 38
End: 2023-07-23

## 2023-07-24 ENCOUNTER — NON-APPOINTMENT (OUTPATIENT)
Age: 38
End: 2023-07-24

## 2023-07-25 ENCOUNTER — NON-APPOINTMENT (OUTPATIENT)
Age: 38
End: 2023-07-25

## 2023-07-25 ENCOUNTER — TRANSCRIPTION ENCOUNTER (OUTPATIENT)
Age: 38
End: 2023-07-25

## 2023-07-26 ENCOUNTER — APPOINTMENT (OUTPATIENT)
Dept: FAMILY MEDICINE | Facility: CLINIC | Age: 38
End: 2023-07-26
Payer: MEDICAID

## 2023-07-26 DIAGNOSIS — R79.82 ELEVATED C-REACTIVE PROTEIN (CRP): ICD-10-CM

## 2023-07-26 DIAGNOSIS — R82.90 UNSPECIFIED ABNORMAL FINDINGS IN URINE: ICD-10-CM

## 2023-07-26 DIAGNOSIS — E78.1 PURE HYPERGLYCERIDEMIA: ICD-10-CM

## 2023-07-26 DIAGNOSIS — S36.112A CONTUSION OF LIVER, INITIAL ENCOUNTER: ICD-10-CM

## 2023-07-26 DIAGNOSIS — E04.1 NONTOXIC SINGLE THYROID NODULE: ICD-10-CM

## 2023-07-26 PROCEDURE — 99214 OFFICE O/P EST MOD 30 MIN: CPT | Mod: 95

## 2023-07-26 NOTE — HISTORY OF PRESENT ILLNESS
[Home] : at home, [unfilled] , at the time of the visit. [Other Location: e.g. Home (Enter Location, City,State)___] : at [unfilled] [Verbal consent obtained from patient] : the patient, [unfilled] [de-identified] : 39 yo female here for acute TEB to go over labs. \par \par H/o liver hematoma. Had recent abd sono with GI and now down to 6 cm from 13 cm. Will follow up in mid August again with GI. Also will have MRI of abdomen as well in 3 months. GI thinks it may be adenoma that ruptured in liver. Had Normal LFTs, hemoglobin blood count iron panel on last labs. Additionally her platelets were better at 414.\par \par Pt with slightly elevated CRP at 9 (nl 4 and under).\par \par Urine showing contamination. Pt stating she was on period. No urinary symptoms.\par \par TSH low but t4 normal. Discussed may be start of post partum hyperthyroidism. Recommend endo eval and repeat tfts in 6 weeks. Reports some weight loss and hair thinning.\par \par Mld HLD, but pt is nursing.

## 2023-07-27 ENCOUNTER — TRANSCRIPTION ENCOUNTER (OUTPATIENT)
Age: 38
End: 2023-07-27

## 2023-08-15 ENCOUNTER — RX RENEWAL (OUTPATIENT)
Age: 38
End: 2023-08-15

## 2023-08-15 RX ORDER — BUSPIRONE HYDROCHLORIDE 7.5 MG/1
7.5 TABLET ORAL
Qty: 180 | Refills: 0 | Status: ACTIVE | COMMUNITY
Start: 2021-07-07 | End: 1900-01-01

## 2023-10-03 ENCOUNTER — NON-APPOINTMENT (OUTPATIENT)
Age: 38
End: 2023-10-03

## 2023-10-04 ENCOUNTER — NON-APPOINTMENT (OUTPATIENT)
Age: 38
End: 2023-10-04

## 2023-10-17 ENCOUNTER — TRANSCRIPTION ENCOUNTER (OUTPATIENT)
Age: 38
End: 2023-10-17

## 2023-10-17 LAB
APPEARANCE: CLEAR
BACTERIA: NEGATIVE /HPF
BILIRUBIN URINE: NEGATIVE
BLOOD URINE: NEGATIVE
CAST: 0 /LPF
COLOR: YELLOW
CRP SERPL-MCNC: <3 MG/L
EPITHELIAL CELLS: 2 /HPF
GLUCOSE QUALITATIVE U: NEGATIVE MG/DL
KETONES URINE: NEGATIVE MG/DL
LEUKOCYTE ESTERASE URINE: ABNORMAL
MICROSCOPIC-UA: NORMAL
NITRITE URINE: NEGATIVE
PH URINE: 6
PROTEIN URINE: NEGATIVE MG/DL
RED BLOOD CELLS URINE: 0 /HPF
SPECIFIC GRAVITY URINE: 1.01
T3 SERPL-MCNC: 95 NG/DL
T4 FREE SERPL-MCNC: 1.4 NG/DL
TSH SERPL-ACNC: 0.34 UIU/ML
UROBILINOGEN URINE: 0.2 MG/DL
WHITE BLOOD CELLS URINE: 1 /HPF

## 2023-10-18 LAB — BACTERIA UR CULT: NORMAL

## 2023-10-27 ENCOUNTER — APPOINTMENT (OUTPATIENT)
Dept: ENDOCRINOLOGY | Facility: CLINIC | Age: 38
End: 2023-10-27
Payer: MEDICAID

## 2023-10-27 VITALS
WEIGHT: 152 LBS | TEMPERATURE: 96.3 F | HEIGHT: 66 IN | BODY MASS INDEX: 24.43 KG/M2 | HEART RATE: 76 BPM | OXYGEN SATURATION: 96 % | DIASTOLIC BLOOD PRESSURE: 75 MMHG | SYSTOLIC BLOOD PRESSURE: 106 MMHG

## 2023-10-27 DIAGNOSIS — E04.1 NONTOXIC SINGLE THYROID NODULE: ICD-10-CM

## 2023-10-27 PROCEDURE — 99204 OFFICE O/P NEW MOD 45 MIN: CPT

## 2023-12-11 ENCOUNTER — RX RENEWAL (OUTPATIENT)
Age: 38
End: 2023-12-11

## 2024-01-07 ENCOUNTER — NON-APPOINTMENT (OUTPATIENT)
Age: 39
End: 2024-01-07

## 2024-02-05 ENCOUNTER — RX RENEWAL (OUTPATIENT)
Age: 39
End: 2024-02-05

## 2024-03-04 ENCOUNTER — NON-APPOINTMENT (OUTPATIENT)
Age: 39
End: 2024-03-04

## 2024-03-26 ENCOUNTER — NON-APPOINTMENT (OUTPATIENT)
Age: 39
End: 2024-03-26

## 2024-03-27 ENCOUNTER — TRANSCRIPTION ENCOUNTER (OUTPATIENT)
Age: 39
End: 2024-03-27

## 2024-03-27 DIAGNOSIS — R50.9 FEVER, UNSPECIFIED: ICD-10-CM

## 2024-03-28 ENCOUNTER — TRANSCRIPTION ENCOUNTER (OUTPATIENT)
Age: 39
End: 2024-03-28

## 2024-03-29 ENCOUNTER — TRANSCRIPTION ENCOUNTER (OUTPATIENT)
Age: 39
End: 2024-03-29

## 2024-03-29 ENCOUNTER — APPOINTMENT (OUTPATIENT)
Dept: ENDOCRINOLOGY | Facility: CLINIC | Age: 39
End: 2024-03-29
Payer: MEDICAID

## 2024-03-29 VITALS
OXYGEN SATURATION: 98 % | SYSTOLIC BLOOD PRESSURE: 101 MMHG | HEART RATE: 79 BPM | HEIGHT: 66 IN | TEMPERATURE: 97.2 F | RESPIRATION RATE: 16 BRPM | WEIGHT: 146 LBS | BODY MASS INDEX: 23.46 KG/M2 | DIASTOLIC BLOOD PRESSURE: 65 MMHG

## 2024-03-29 PROCEDURE — 99213 OFFICE O/P EST LOW 20 MIN: CPT

## 2024-04-01 ENCOUNTER — TRANSCRIPTION ENCOUNTER (OUTPATIENT)
Age: 39
End: 2024-04-01

## 2024-04-01 LAB
T4 FREE SERPL-MCNC: 1.6 NG/DL
TSH SERPL-ACNC: 0.35 UIU/ML

## 2024-04-02 LAB
THYROGLOB AB SERPL-ACNC: <20 IU/ML
THYROPEROXIDASE AB SERPL IA-ACNC: 40.9 IU/ML

## 2024-04-04 ENCOUNTER — NON-APPOINTMENT (OUTPATIENT)
Age: 39
End: 2024-04-04

## 2024-04-05 ENCOUNTER — NON-APPOINTMENT (OUTPATIENT)
Age: 39
End: 2024-04-05

## 2024-04-05 RX ORDER — BUSPIRONE HYDROCHLORIDE 10 MG/1
10 TABLET ORAL
Qty: 60 | Refills: 0 | Status: ACTIVE | COMMUNITY
Start: 2024-03-05 | End: 1900-01-01

## 2024-04-07 ENCOUNTER — NON-APPOINTMENT (OUTPATIENT)
Age: 39
End: 2024-04-07

## 2024-04-08 ENCOUNTER — TRANSCRIPTION ENCOUNTER (OUTPATIENT)
Age: 39
End: 2024-04-08

## 2024-04-09 ENCOUNTER — TRANSCRIPTION ENCOUNTER (OUTPATIENT)
Age: 39
End: 2024-04-09

## 2024-04-09 LAB
DENV IGG SERPL QL IA: 1.15 ISR
DENV IGM SERPL QL IA: 3.02 ISR

## 2024-04-10 ENCOUNTER — APPOINTMENT (OUTPATIENT)
Dept: FAMILY MEDICINE | Facility: CLINIC | Age: 39
End: 2024-04-10
Payer: MEDICAID

## 2024-04-10 DIAGNOSIS — A90 DENGUE FEVER [CLASSICAL DENGUE]: ICD-10-CM

## 2024-04-10 PROCEDURE — 99214 OFFICE O/P EST MOD 30 MIN: CPT | Mod: 95

## 2024-04-10 NOTE — HISTORY OF PRESENT ILLNESS
[Home] : at home, [unfilled] , at the time of the visit. [Other Location: e.g. Home (Enter Location, City,State)___] : at [unfilled] [Verbal consent obtained from patient] : the patient, [unfilled] [FreeTextEntry8] : 38 yo female here for acute illness. Was in [facility] 3/13-20. After returning she developed chills, fever 103.8, and severe body aches. We tested for Dengue and it came back positive. Fever resolved within a few days, but then had bad nausea for a few days and fatigue. Developed rash on hands and arms, thighs, and knees. Rash was very itchy, then resolved. Reports symptoms are mostly better, still with fatigue. Has been eating better, drinking water.   H/o liver hematoma. Last imaging showed it's shrinking. GI thinks it may be adenoma that ruptured in liver. Had Normal LFTs, hemoglobin blood count iron panel on last labs. WiIl be going for follow up MRI abd soon.

## 2024-04-17 ENCOUNTER — TRANSCRIPTION ENCOUNTER (OUTPATIENT)
Age: 39
End: 2024-04-17

## 2024-04-17 LAB
ALBUMIN SERPL ELPH-MCNC: 4.6 G/DL
ALP BLD-CCNC: 101 U/L
ALT SERPL-CCNC: 16 U/L
ANION GAP SERPL CALC-SCNC: 14 MMOL/L
AST SERPL-CCNC: 14 U/L
BASOPHILS # BLD AUTO: 0.02 K/UL
BASOPHILS NFR BLD AUTO: 0.3 %
BILIRUB SERPL-MCNC: 0.5 MG/DL
BUN SERPL-MCNC: 12 MG/DL
CALCIUM SERPL-MCNC: 9.7 MG/DL
CHLORIDE SERPL-SCNC: 103 MMOL/L
CO2 SERPL-SCNC: 23 MMOL/L
CREAT SERPL-MCNC: 0.89 MG/DL
EGFR: 83 ML/MIN/1.73M2
EOSINOPHIL # BLD AUTO: 0.19 K/UL
EOSINOPHIL NFR BLD AUTO: 2.4 %
GLUCOSE SERPL-MCNC: 97 MG/DL
HCT VFR BLD CALC: 38.7 %
HGB BLD-MCNC: 12.4 G/DL
IMM GRANULOCYTES NFR BLD AUTO: 0.3 %
LYMPHOCYTES # BLD AUTO: 2.83 K/UL
LYMPHOCYTES NFR BLD AUTO: 36 %
MAN DIFF?: NORMAL
MCHC RBC-ENTMCNC: 30.4 PG
MCHC RBC-ENTMCNC: 32 GM/DL
MCV RBC AUTO: 94.9 FL
MONOCYTES # BLD AUTO: 0.5 K/UL
MONOCYTES NFR BLD AUTO: 6.4 %
NEUTROPHILS # BLD AUTO: 4.3 K/UL
NEUTROPHILS NFR BLD AUTO: 54.6 %
PLATELET # BLD AUTO: 335 K/UL
POTASSIUM SERPL-SCNC: 5.1 MMOL/L
PROT SERPL-MCNC: 7.4 G/DL
RBC # BLD: 4.08 M/UL
RBC # FLD: 12.9 %
SODIUM SERPL-SCNC: 141 MMOL/L
WBC # FLD AUTO: 7.86 K/UL

## 2024-06-25 ENCOUNTER — TRANSCRIPTION ENCOUNTER (OUTPATIENT)
Age: 39
End: 2024-06-25

## 2024-06-28 ENCOUNTER — APPOINTMENT (OUTPATIENT)
Dept: ENDOCRINOLOGY | Facility: CLINIC | Age: 39
End: 2024-06-28

## 2024-06-28 VITALS
DIASTOLIC BLOOD PRESSURE: 66 MMHG | WEIGHT: 149 LBS | HEART RATE: 77 BPM | BODY MASS INDEX: 23.95 KG/M2 | HEIGHT: 66 IN | TEMPERATURE: 98.2 F | OXYGEN SATURATION: 99 % | RESPIRATION RATE: 16 BRPM | SYSTOLIC BLOOD PRESSURE: 106 MMHG

## 2024-06-28 DIAGNOSIS — L65.9 NONSCARRING HAIR LOSS, UNSPECIFIED: ICD-10-CM

## 2024-06-28 PROCEDURE — G2211 COMPLEX E/M VISIT ADD ON: CPT

## 2024-06-28 PROCEDURE — 99214 OFFICE O/P EST MOD 30 MIN: CPT

## 2024-07-01 LAB
FERRITIN SERPL-MCNC: 34 NG/ML
IRON SATN MFR SERPL: 29 %
IRON SERPL-MCNC: 95 UG/DL
T3 SERPL-MCNC: 77 NG/DL
T4 FREE SERPL-MCNC: 1.2 NG/DL
TIBC SERPL-MCNC: 335 UG/DL
TSH SERPL-ACNC: 0.45 UIU/ML
UIBC SERPL-MCNC: 239 UG/DL

## 2024-07-02 LAB
TESTOST FREE SERPL-MCNC: 3.5 PG/ML
TESTOST SERPL-MCNC: 25.2 NG/DL

## 2024-08-17 ENCOUNTER — EMERGENCY (EMERGENCY)
Facility: HOSPITAL | Age: 39
LOS: 1 days | Discharge: HOME | End: 2024-08-17
Attending: EMERGENCY MEDICINE | Admitting: EMERGENCY MEDICINE
Payer: MEDICAID

## 2024-08-17 VITALS
HEART RATE: 110 BPM | OXYGEN SATURATION: 96 % | HEIGHT: 66 IN | SYSTOLIC BLOOD PRESSURE: 125 MMHG | RESPIRATION RATE: 20 BRPM | TEMPERATURE: 98 F | WEIGHT: 149.91 LBS | DIASTOLIC BLOOD PRESSURE: 93 MMHG

## 2024-08-17 VITALS — HEART RATE: 73 BPM

## 2024-08-17 DIAGNOSIS — J02.9 ACUTE PHARYNGITIS, UNSPECIFIED: ICD-10-CM

## 2024-08-17 DIAGNOSIS — Z88.0 ALLERGY STATUS TO PENICILLIN: ICD-10-CM

## 2024-08-17 DIAGNOSIS — R00.2 PALPITATIONS: ICD-10-CM

## 2024-08-17 DIAGNOSIS — Z20.822 CONTACT WITH AND (SUSPECTED) EXPOSURE TO COVID-19: ICD-10-CM

## 2024-08-17 LAB
ANION GAP SERPL CALC-SCNC: 5 MMOL/L — LOW (ref 9–16)
BASOPHILS # BLD AUTO: 0.06 K/UL — SIGNIFICANT CHANGE UP (ref 0–0.2)
BASOPHILS NFR BLD AUTO: 0.7 % — SIGNIFICANT CHANGE UP (ref 0–2)
BUN SERPL-MCNC: 11 MG/DL — SIGNIFICANT CHANGE UP (ref 7–23)
CALCIUM SERPL-MCNC: 8.5 MG/DL — SIGNIFICANT CHANGE UP (ref 8.5–10.5)
CHLORIDE SERPL-SCNC: 107 MMOL/L — SIGNIFICANT CHANGE UP (ref 96–108)
CO2 SERPL-SCNC: 28 MMOL/L — SIGNIFICANT CHANGE UP (ref 22–31)
CREAT SERPL-MCNC: 0.71 MG/DL — SIGNIFICANT CHANGE UP (ref 0.5–1.3)
EGFR: 109 ML/MIN/1.73M2 — SIGNIFICANT CHANGE UP
EOSINOPHIL # BLD AUTO: 0.28 K/UL — SIGNIFICANT CHANGE UP (ref 0–0.5)
EOSINOPHIL NFR BLD AUTO: 3 % — SIGNIFICANT CHANGE UP (ref 0–6)
FLUAV AG NPH QL: SIGNIFICANT CHANGE UP
FLUBV AG NPH QL: SIGNIFICANT CHANGE UP
GLUCOSE SERPL-MCNC: 98 MG/DL — SIGNIFICANT CHANGE UP (ref 70–99)
HCT VFR BLD CALC: 40.4 % — SIGNIFICANT CHANGE UP (ref 34.5–45)
HGB BLD-MCNC: 13.2 G/DL — SIGNIFICANT CHANGE UP (ref 11.5–15.5)
IMM GRANULOCYTES NFR BLD AUTO: 0.3 % — SIGNIFICANT CHANGE UP (ref 0–0.9)
LYMPHOCYTES # BLD AUTO: 3.04 K/UL — SIGNIFICANT CHANGE UP (ref 1–3.3)
LYMPHOCYTES # BLD AUTO: 33.1 % — SIGNIFICANT CHANGE UP (ref 13–44)
MAGNESIUM SERPL-MCNC: 2.3 MG/DL — SIGNIFICANT CHANGE UP (ref 1.6–2.6)
MCHC RBC-ENTMCNC: 30.4 PG — SIGNIFICANT CHANGE UP (ref 27–34)
MCHC RBC-ENTMCNC: 32.7 GM/DL — SIGNIFICANT CHANGE UP (ref 32–36)
MCV RBC AUTO: 93.1 FL — SIGNIFICANT CHANGE UP (ref 80–100)
MONOCYTES # BLD AUTO: 0.6 K/UL — SIGNIFICANT CHANGE UP (ref 0–0.9)
MONOCYTES NFR BLD AUTO: 6.5 % — SIGNIFICANT CHANGE UP (ref 2–14)
NEUTROPHILS # BLD AUTO: 5.18 K/UL — SIGNIFICANT CHANGE UP (ref 1.8–7.4)
NEUTROPHILS NFR BLD AUTO: 56.4 % — SIGNIFICANT CHANGE UP (ref 43–77)
NRBC # BLD: 0 /100 WBCS — SIGNIFICANT CHANGE UP (ref 0–0)
PLATELET # BLD AUTO: 314 K/UL — SIGNIFICANT CHANGE UP (ref 150–400)
POTASSIUM SERPL-MCNC: 4.7 MMOL/L — SIGNIFICANT CHANGE UP (ref 3.5–5.3)
POTASSIUM SERPL-SCNC: 4.7 MMOL/L — SIGNIFICANT CHANGE UP (ref 3.5–5.3)
RBC # BLD: 4.34 M/UL — SIGNIFICANT CHANGE UP (ref 3.8–5.2)
RBC # FLD: 11.9 % — SIGNIFICANT CHANGE UP (ref 10.3–14.5)
RSV RNA NPH QL NAA+NON-PROBE: SIGNIFICANT CHANGE UP
S PYO AG SPEC QL IA: NEGATIVE — SIGNIFICANT CHANGE UP
SARS-COV-2 RNA SPEC QL NAA+PROBE: SIGNIFICANT CHANGE UP
SODIUM SERPL-SCNC: 140 MMOL/L — SIGNIFICANT CHANGE UP (ref 132–145)
TROPONIN I, HIGH SENSITIVITY RESULT: 8.6 NG/L — SIGNIFICANT CHANGE UP
TSH SERPL-MCNC: 1.09 UIU/ML — SIGNIFICANT CHANGE UP (ref 0.36–3.74)
WBC # BLD: 9.19 K/UL — SIGNIFICANT CHANGE UP (ref 3.8–10.5)
WBC # FLD AUTO: 9.19 K/UL — SIGNIFICANT CHANGE UP (ref 3.8–10.5)

## 2024-08-17 PROCEDURE — 99285 EMERGENCY DEPT VISIT HI MDM: CPT

## 2024-08-17 RX ORDER — ACETAMINOPHEN 500 MG
975 TABLET ORAL ONCE
Refills: 0 | Status: COMPLETED | OUTPATIENT
Start: 2024-08-17 | End: 2024-08-17

## 2024-08-17 RX ADMIN — Medication 975 MILLIGRAM(S): at 07:15

## 2024-08-17 NOTE — ED ADULT NURSE NOTE - OBJECTIVE STATEMENT
38y/o female c/o intermittent palpitations , states "heart is pounding harder not faster". PMHx of preeclampsia and anxiety. pt is on buspirone.

## 2024-08-17 NOTE — ED ADULT NURSE NOTE - CAS TRG GEN SKIN COLOR
2 days prior started c/o sore throat, little cough  yesterday had headache and sorethroat, and today started with ear pain on left side  no vomiting, no diarrhea, no rashes, no fevers at all  drinking, voiding  positive sick contacts 2 days prior started c/o sore throat, little cough  yesterday had headache and sorethroat, and today started with ear pain on left side  no vomiting, no diarrhea, no rashes, no fevers at all  drinking, voiding  positive sick contacts  NKDA Normal for race

## 2024-08-17 NOTE — ED ADULT NURSE NOTE - NSFALLUNIVINTERV_ED_ALL_ED
Bed/Stretcher in lowest position, wheels locked, appropriate side rails in place/Call bell, personal items and telephone in reach/Instruct patient to call for assistance before getting out of bed/chair/stretcher/Non-slip footwear applied when patient is off stretcher/[place] to call system/Physically safe environment - no spills, clutter or unnecessary equipment/Purposeful proactive rounding/Room/bathroom lighting operational, light cord in reach

## 2024-08-17 NOTE — ED PROVIDER NOTE - OBJECTIVE STATEMENT
39-year-old female G1 para 1, past medical history of preeclampsia with induction full-term  15 months ago, history of thyroid nodule, who presents with palpitations that are felt as her heart is pounding out of her chest with no associated chest pain syncope or shortness of breath.  Denies associated nausea vomiting diarrhea or abdominal pain.  The palpitations this evening lasted approximately 45 minutes but patient notes that she has been having intermittent palpitations for the last 3 weeks but this evening they were prolonged.  She denies smoking drugs or alcohol.  Patient recently weaned off breast-feeding.  Denies recent trauma.  Patient also incidentally complains of sore throat with white patches on the throat with no associated cough fever chills neck pain or stiffness.  Notes that daughter who is 15 months old had mild viral illness.

## 2024-08-17 NOTE — ED PROVIDER NOTE - PATIENT PORTAL LINK FT
You can access the FollowMyHealth Patient Portal offered by [facility] by registering at the following website: [email or web address]. By joining [employer]’s FollowMyHealth portal, you will also be able to view your health information using other applications (apps) compatible with our system.

## 2024-08-17 NOTE — ED ADULT TRIAGE NOTE - CHIEF COMPLAINT QUOTE
Pt walked into ER c/o heart pounding and tightness in head since appx 0300 tonight. Pt denies SOB, dizziness, NV, or further at triage. PMH anxiety and hematoma on liver from child birth appx 15 months ago.

## 2024-08-17 NOTE — ED PROVIDER NOTE - CARE PROVIDER_API CALL
Sim Harris  Cardiovascular Disease  [address] [place] [address], [address]  [place] [address]  Phone: [phone]  Fax: [phone]  Follow Up Time:

## 2024-08-17 NOTE — ED PROVIDER NOTE - CLINICAL SUMMARY MEDICAL DECISION MAKING FREE TEXT BOX
39-year-old female G1 para 1, past medical history of preeclampsia with induction full-term  15 months ago, history of thyroid nodule, who presents with palpitations that are felt as her heart is pounding out of her chest with no associated chest pain syncope or shortness of breath.  Denies associated nausea vomiting diarrhea or abdominal pain.  The palpitations this evening lasted approximately 45 minutes but patient notes that she has been having intermittent palpitations for the last 3 weeks but this evening they were prolonged.  She denies smoking drugs or alcohol.  Patient recently weaned off breast-feeding.  Denies recent trauma.  Patient also incidentally complains of sore throat with white patches on the throat with no associated cough fever chills neck pain or stiffness.  Notes that daughter who is 15 months old had mild viral illness.    Plan to rule out electrolyte imbalance, check 1 troponin, rule out strep and COVID flu and RSV.  I do not suspect pulmonary embolism dissection acute infectious process such as pneumonia or pneumothorax.  Patient currently is not having active palpitations.  Will also check thyroid level.  Patient will require referral to cardiology for persistent palpitations coming and going for approximately 3 weeks.

## 2024-08-17 NOTE — ED PROVIDER NOTE - NSFOLLOWUPINSTRUCTIONS_ED_ALL_ED_FT
Palpitations    A palpitation is the feeling that your heartbeat is irregular or is faster than normal. It may feel like your heart is fluttering or skipping a beat. They may be caused by many things, including smoking, caffeine, alcohol, stress, and certain medicines. Although most causes of palpitations are not serious, palpitations can be a sign of a serious medical problem. Avoid caffeine, alcohol, and tobacco products at home. Try to reduce stress and anxiety and make sure to get plenty of rest.     SEEK IMMEDIATE MEDICAL CARE IF YOU HAVE ANY OF THE FOLLOWING SYMPTOMS: chest pain, shortness of breath, severe headache, dizziness/lightheadedness, or fainting.     Pharyngitis    Pharyngitis is inflammation of your pharynx, which is typically caused by a viral or bacterial infection. Pharyngitis can be contagious and may spread from person to person through intimate contact, coughing, sneezing, or sharing personal items and utensils. Symptoms of pharyngitis may include sore throat, fever, headache, or swollen lymph nodes. If you are prescribed antibiotics, make sure you finish them even if you start to feel better. Gargle with salt water as often as every 1-2 hours to soothe your throat. Throat lozenges (if you are not at risk for choking) or sprays may be used to soothe your throat.    SEEK IMMEDIATE MEDICAL CARE IF YOU HAVE ANY OF THE FOLLOWING SYMPTOMS: neck stiffness, drooling, hoarseness or change in voice, inability to swallow liquids, vomiting, or trouble breathing.

## 2024-08-18 ENCOUNTER — NON-APPOINTMENT (OUTPATIENT)
Age: 39
End: 2024-08-18

## 2024-08-18 LAB
CULTURE RESULTS: SIGNIFICANT CHANGE UP
SPECIMEN SOURCE: SIGNIFICANT CHANGE UP

## 2024-08-19 ENCOUNTER — NON-APPOINTMENT (OUTPATIENT)
Age: 39
End: 2024-08-19

## 2024-08-19 ENCOUNTER — TRANSCRIPTION ENCOUNTER (OUTPATIENT)
Age: 39
End: 2024-08-19

## 2024-08-20 ENCOUNTER — APPOINTMENT (OUTPATIENT)
Dept: FAMILY MEDICINE | Facility: CLINIC | Age: 39
End: 2024-08-20
Payer: COMMERCIAL

## 2024-08-20 PROCEDURE — 99214 OFFICE O/P EST MOD 30 MIN: CPT | Mod: 95

## 2024-08-20 PROCEDURE — G2211 COMPLEX E/M VISIT ADD ON: CPT

## 2024-08-20 NOTE — HISTORY OF PRESENT ILLNESS
[Home] : at home, [unfilled] , at the time of the visit. [Other Location: e.g. Home (Enter Location, City,State)___] : at [unfilled] [Verbal consent obtained from patient] : the patient, [unfilled] [Post-hospitalization from ___ Hospital] : Post-hospitalization from [unfilled] Hospital [Discharge Summary] : discharge summary [Pertinent Labs] : pertinent labs [FreeTextEntry2] : 38 yo female here for ER follow up. [place]ley like her heart was pounding out of her chest at 3 am 3 days ago. Went to ER at that time. Also had a minor sore throat that week and enlarged tonsils.  Had negative troponins, normal EKG, normal cbc, TSH. COVID, strep, and flu testing negative. Was calm throughout and doesn't think anxiety related. Did just wean breastfeeding so is unsure if can relate to hormonal changes.

## 2024-08-21 ENCOUNTER — LABORATORY RESULT (OUTPATIENT)
Age: 39
End: 2024-08-21

## 2024-08-22 ENCOUNTER — NON-APPOINTMENT (OUTPATIENT)
Age: 39
End: 2024-08-22

## 2024-08-22 ENCOUNTER — APPOINTMENT (OUTPATIENT)
Dept: FAMILY MEDICINE | Facility: CLINIC | Age: 39
End: 2024-08-22
Payer: COMMERCIAL

## 2024-08-22 VITALS
TEMPERATURE: 97.7 F | HEIGHT: 66 IN | SYSTOLIC BLOOD PRESSURE: 125 MMHG | OXYGEN SATURATION: 100 % | DIASTOLIC BLOOD PRESSURE: 80 MMHG | WEIGHT: 155 LBS | HEART RATE: 65 BPM | BODY MASS INDEX: 24.91 KG/M2

## 2024-08-22 DIAGNOSIS — G89.29 DORSALGIA, UNSPECIFIED: ICD-10-CM

## 2024-08-22 DIAGNOSIS — M54.9 DORSALGIA, UNSPECIFIED: ICD-10-CM

## 2024-08-22 DIAGNOSIS — Z00.00 ENCOUNTER FOR GENERAL ADULT MEDICAL EXAMINATION W/OUT ABNORMAL FINDINGS: ICD-10-CM

## 2024-08-22 LAB
25(OH)D3 SERPL-MCNC: 31.8 NG/ML
ALBUMIN SERPL ELPH-MCNC: 4.3 G/DL
ALP BLD-CCNC: 78 U/L
ALT SERPL-CCNC: 9 U/L
ANION GAP SERPL CALC-SCNC: 11 MMOL/L
APPEARANCE: CLEAR
AST SERPL-CCNC: 14 U/L
BACTERIA: NEGATIVE /HPF
BASOPHILS # BLD AUTO: 0.06 K/UL
BASOPHILS NFR BLD AUTO: 0.7 %
BILIRUB SERPL-MCNC: 0.5 MG/DL
BILIRUBIN URINE: NEGATIVE
BLOOD URINE: NEGATIVE
BUN SERPL-MCNC: 17 MG/DL
CALCIUM SERPL-MCNC: 9.4 MG/DL
CAST: 0 /LPF
CHLORIDE SERPL-SCNC: 102 MMOL/L
CHOLEST SERPL-MCNC: 215 MG/DL
CO2 SERPL-SCNC: 26 MMOL/L
COLOR: YELLOW
CREAT SERPL-MCNC: 0.87 MG/DL
EGFR: 86 ML/MIN/1.73M2
EOSINOPHIL # BLD AUTO: 0.28 K/UL
EOSINOPHIL NFR BLD AUTO: 3.3 %
EPITHELIAL CELLS: 2 /HPF
ESTIMATED AVERAGE GLUCOSE: 114 MG/DL
FERRITIN SERPL-MCNC: 41 NG/ML
FOLATE SERPL-MCNC: 12 NG/ML
GLUCOSE QUALITATIVE U: NEGATIVE MG/DL
GLUCOSE SERPL-MCNC: 87 MG/DL
HBA1C MFR BLD HPLC: 5.6 %
HCT VFR BLD CALC: 43.6 %
HDLC SERPL-MCNC: 53 MG/DL
HGB BLD-MCNC: 13.4 G/DL
IMM GRANULOCYTES NFR BLD AUTO: 0.2 %
IRON SATN MFR SERPL: 39 %
IRON SERPL-MCNC: 127 UG/DL
KETONES URINE: NEGATIVE MG/DL
LDLC SERPL CALC-MCNC: 142 MG/DL
LEUKOCYTE ESTERASE URINE: NEGATIVE
LYMPHOCYTES # BLD AUTO: 3.27 K/UL
LYMPHOCYTES NFR BLD AUTO: 38.6 %
MAN DIFF?: NORMAL
MCHC RBC-ENTMCNC: 30 PG
MCHC RBC-ENTMCNC: 30.7 GM/DL
MCV RBC AUTO: 97.5 FL
MICROSCOPIC-UA: NORMAL
MONOCYTES # BLD AUTO: 0.46 K/UL
MONOCYTES NFR BLD AUTO: 5.4 %
NEUTROPHILS # BLD AUTO: 4.38 K/UL
NEUTROPHILS NFR BLD AUTO: 51.8 %
NITRITE URINE: NEGATIVE
NONHDLC SERPL-MCNC: 162 MG/DL
PH URINE: 5.5
PLATELET # BLD AUTO: 382 K/UL
POTASSIUM SERPL-SCNC: 4.9 MMOL/L
PROT SERPL-MCNC: 7.2 G/DL
PROTEIN URINE: NEGATIVE MG/DL
RBC # BLD: 4.47 M/UL
RBC # FLD: 12.2 %
RED BLOOD CELLS URINE: 2 /HPF
SODIUM SERPL-SCNC: 138 MMOL/L
SPECIFIC GRAVITY URINE: 1.02
TIBC SERPL-MCNC: 326 UG/DL
TRANSFERRIN SERPL-MCNC: 262 MG/DL
TRIGL SERPL-MCNC: 111 MG/DL
UIBC SERPL-MCNC: 198 UG/DL
UROBILINOGEN URINE: 0.2 MG/DL
VIT B12 SERPL-MCNC: 1070 PG/ML
WBC # FLD AUTO: 8.47 K/UL
WHITE BLOOD CELLS URINE: 0 /HPF

## 2024-08-22 PROCEDURE — 99396 PREV VISIT EST AGE 40-64: CPT | Mod: 25

## 2024-08-22 PROCEDURE — 93000 ELECTROCARDIOGRAM COMPLETE: CPT

## 2024-08-22 NOTE — ASSESSMENT
[FreeTextEntry1] : CPE- Well exam, comprehensive labs ordered. STD panel declined.  GYN for PAP.  Labs reviewed and discussed. Borderline lipids- will work on diet control. Mammo script provided. EKG NSR.

## 2024-08-22 NOTE — HISTORY OF PRESENT ILLNESS
[de-identified] : 38 yo female here for CPE. Feeling well today.   Pt recently seen in ER for palpitations. EKG NSR today. No longer with symptoms. Has cardio appt coming up.  Admitting to left shoulder pain for about a year. Also decreased range of motion.   Also, with pain that goes from left buttocks down the lateral aspect of thigh. No tingling or numbness down the leg. Worse since she was pregnant.

## 2024-08-22 NOTE — PHYSICAL EXAM
[No Acute Distress] : no acute distress [Well Nourished] : well nourished [Well Developed] : well developed [Well-Appearing] : well-appearing [Normal Sclera/Conjunctiva] : normal sclera/conjunctiva [PERRL] : pupils equal round and reactive to light [EOMI] : extraocular movements intact [Normal Outer Ear/Nose] : the outer ears and nose were normal in appearance [Normal Oropharynx] : the oropharynx was normal [No JVD] : no jugular venous distention [No Lymphadenopathy] : no lymphadenopathy [Supple] : supple [Thyroid Normal, No Nodules] : the thyroid was normal and there were no nodules present [No Respiratory Distress] : no respiratory distress  [No Accessory Muscle Use] : no accessory muscle use [Clear to Auscultation] : lungs were clear to auscultation bilaterally [Normal Rate] : normal rate  [Regular Rhythm] : with a regular rhythm [Normal S1, S2] : normal S1 and S2 [No Murmur] : no murmur heard [No Abdominal Bruit] : a ~M bruit was not heard ~T in the abdomen [No Varicosities] : no varicosities [No Edema] : there was no peripheral edema [No Palpable Aorta] : no palpable aorta [No Extremity Clubbing/Cyanosis] : no extremity clubbing/cyanosis [Soft] : abdomen soft [Non Tender] : non-tender [Non-distended] : non-distended [No Masses] : no abdominal mass palpated [No HSM] : no HSM [Normal Bowel Sounds] : normal bowel sounds [Normal Posterior Cervical Nodes] : no posterior cervical lymphadenopathy [Normal Anterior Cervical Nodes] : no anterior cervical lymphadenopathy [No CVA Tenderness] : no CVA  tenderness [No Spinal Tenderness] : no spinal tenderness [No Joint Swelling] : no joint swelling [Grossly Normal Strength/Tone] : grossly normal strength/tone [No Rash] : no rash [No Focal Deficits] : no focal deficits [Normal Gait] : normal gait [Normal Affect] : the affect was normal [Normal Insight/Judgement] : insight and judgment were intact

## 2024-08-22 NOTE — HEALTH RISK ASSESSMENT
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

## 2024-08-23 PROBLEM — R00.2 PALPITATIONS: Status: ACTIVE | Noted: 2024-08-20

## 2024-08-23 PROBLEM — R59.0 INGUINAL LYMPHADENOPATHY: Status: RESOLVED | Noted: 2019-04-01 | Resolved: 2024-08-23

## 2024-08-23 PROBLEM — M79.642 CHRONIC PAIN OF LEFT HAND: Status: RESOLVED | Noted: 2021-10-21 | Resolved: 2024-08-23

## 2024-08-23 PROBLEM — Z87.898 HISTORY OF SNORING: Status: RESOLVED | Noted: 2019-05-23 | Resolved: 2024-08-23

## 2024-08-23 PROBLEM — R27.8 DECREASED DEXTERITY: Status: RESOLVED | Noted: 2021-10-21 | Resolved: 2024-08-23

## 2024-08-23 PROBLEM — Z87.898 HISTORY OF FATIGUE: Status: RESOLVED | Noted: 2019-03-08 | Resolved: 2024-08-23

## 2024-08-23 PROBLEM — M79.89 HAND SWELLING: Status: RESOLVED | Noted: 2019-04-01 | Resolved: 2024-08-23

## 2024-08-23 PROBLEM — Z13.29 SCREENING FOR THYROID DISORDER: Status: RESOLVED | Noted: 2018-06-28 | Resolved: 2024-08-23

## 2024-08-23 PROBLEM — Z87.898 HISTORY OF WEIGHT GAIN: Status: RESOLVED | Noted: 2021-05-27 | Resolved: 2024-08-23

## 2024-08-23 PROBLEM — R59.1 LYMPHADENOPATHY OF HEAD AND NECK: Status: RESOLVED | Noted: 2019-04-23 | Resolved: 2024-08-23

## 2024-08-23 PROBLEM — H93.8X9 EAR FULLNESS: Status: RESOLVED | Noted: 2018-06-28 | Resolved: 2024-08-23

## 2024-08-23 PROBLEM — R29.898 RIGHT HAND WEAKNESS: Status: RESOLVED | Noted: 2021-10-21 | Resolved: 2024-08-23

## 2024-08-23 PROBLEM — Z86.018 HISTORY OF MULTIPLE MELANOCYTIC NEVI: Status: RESOLVED | Noted: 2021-06-11 | Resolved: 2024-08-23

## 2024-08-23 PROBLEM — Z87.2 HISTORY OF FURUNCLE: Status: RESOLVED | Noted: 2021-06-11 | Resolved: 2024-08-23

## 2024-08-23 PROBLEM — Z87.2 HISTORY OF SKIN PRURITUS: Status: RESOLVED | Noted: 2019-11-14 | Resolved: 2024-08-23

## 2024-08-23 PROBLEM — R79.82 CRP ELEVATED: Status: RESOLVED | Noted: 2021-09-08 | Resolved: 2024-08-23

## 2024-08-23 PROBLEM — J35.1 TONSILLAR HYPERTROPHY: Status: RESOLVED | Noted: 2019-05-23 | Resolved: 2024-08-23

## 2024-08-23 PROBLEM — G56.01 CARPAL TUNNEL SYNDROME OF RIGHT WRIST: Status: RESOLVED | Noted: 2019-10-01 | Resolved: 2024-08-23

## 2024-08-23 PROBLEM — R94.6 BORDERLINE ABNORMAL TFTS: Status: RESOLVED | Noted: 2023-07-07 | Resolved: 2024-08-23

## 2024-08-23 PROBLEM — M79.641 CHRONIC PAIN OF RIGHT HAND: Status: RESOLVED | Noted: 2021-10-21 | Resolved: 2024-08-23

## 2024-08-23 PROBLEM — M54.50 CHRONIC LOW BACK PAIN: Status: RESOLVED | Noted: 2021-10-08 | Resolved: 2024-08-23

## 2024-08-23 PROBLEM — Z86.2 HISTORY OF LYMPHOCYTOSIS: Status: RESOLVED | Noted: 2019-03-12 | Resolved: 2024-08-23

## 2024-08-23 PROBLEM — Z87.898 HISTORY OF DIZZINESS: Status: RESOLVED | Noted: 2018-06-28 | Resolved: 2024-08-23

## 2024-08-23 PROBLEM — Z86.2 HISTORY OF THROMBOCYTOSIS: Status: RESOLVED | Noted: 2019-04-18 | Resolved: 2024-08-23

## 2024-08-23 PROBLEM — M25.541 ARTHRALGIA OF BOTH HANDS: Status: RESOLVED | Noted: 2021-11-02 | Resolved: 2024-08-23

## 2024-08-23 PROBLEM — Z12.83 SKIN CANCER SCREENING: Status: RESOLVED | Noted: 2021-06-11 | Resolved: 2024-08-23

## 2024-08-23 PROBLEM — H04.123 DRY EYES: Status: RESOLVED | Noted: 2019-04-01 | Resolved: 2024-08-23

## 2024-08-23 PROBLEM — Z86.2 HISTORY OF LEUKOCYTOSIS: Status: RESOLVED | Noted: 2021-06-08 | Resolved: 2024-08-23

## 2024-08-23 PROBLEM — Z87.828 HISTORY OF SPRAIN OF ANKLE: Status: RESOLVED | Noted: 2019-10-01 | Resolved: 2024-08-23

## 2024-08-23 PROBLEM — Z13.21 ENCOUNTER FOR VITAMIN DEFICIENCY SCREENING: Status: RESOLVED | Noted: 2018-06-28 | Resolved: 2024-08-23

## 2024-08-23 PROBLEM — R82.90 ABNORMAL URINALYSIS: Status: RESOLVED | Noted: 2021-06-08 | Resolved: 2024-08-23

## 2024-08-23 PROBLEM — N64.4 BREAST PAIN, RIGHT: Status: RESOLVED | Noted: 2021-06-08 | Resolved: 2024-08-23

## 2024-08-23 PROBLEM — S93.412A SPRAIN OF CALCANEOFIBULAR LIGAMENT OF LEFT ANKLE, INITIAL ENCOUNTER: Status: RESOLVED | Noted: 2019-11-20 | Resolved: 2024-08-23

## 2024-08-23 PROBLEM — Z86.2 HISTORY OF ANEMIA: Status: RESOLVED | Noted: 2023-07-06 | Resolved: 2024-08-23

## 2024-08-23 PROBLEM — M79.643 CHRONIC HAND PAIN: Status: RESOLVED | Noted: 2021-10-21 | Resolved: 2024-08-23

## 2024-08-23 PROBLEM — M24.272 LIGAMENTOUS LAXITY OF LEFT ANKLE: Status: RESOLVED | Noted: 2019-11-20 | Resolved: 2024-08-23

## 2024-08-23 PROBLEM — R21 RASH: Status: RESOLVED | Noted: 2019-03-08 | Resolved: 2024-08-23

## 2024-08-23 PROBLEM — Z01.00 ENCOUNTER FOR VISION SCREENING: Status: RESOLVED | Noted: 2022-09-01 | Resolved: 2024-08-23

## 2024-08-23 PROBLEM — Z88.9 HISTORY OF ALLERGY: Status: RESOLVED | Noted: 2023-07-06 | Resolved: 2024-08-23

## 2024-08-23 PROBLEM — Z13.1 SCREENING FOR DIABETES MELLITUS: Status: RESOLVED | Noted: 2018-06-28 | Resolved: 2024-08-23

## 2024-08-23 PROBLEM — M25.552 LEFT HIP PAIN: Status: RESOLVED | Noted: 2021-05-27 | Resolved: 2024-08-23

## 2024-08-23 PROBLEM — M25.372 INSTABILITY OF LEFT ANKLE JOINT: Status: RESOLVED | Noted: 2019-11-20 | Resolved: 2024-08-23

## 2024-08-23 PROBLEM — Z13.220 SCREENING FOR HYPERLIPIDEMIA: Status: RESOLVED | Noted: 2018-06-28 | Resolved: 2024-08-23

## 2024-08-23 PROBLEM — M25.641 STIFFNESS OF RIGHT HAND JOINT: Status: RESOLVED | Noted: 2021-05-27 | Resolved: 2024-08-23

## 2024-08-23 PROBLEM — S93.492A SPRAIN OF ANTERIOR TALOFIBULAR LIGAMENT OF LEFT ANKLE, INITIAL ENCOUNTER: Status: RESOLVED | Noted: 2019-11-20 | Resolved: 2024-08-23

## 2024-08-23 PROBLEM — M25.572 ACUTE LEFT ANKLE PAIN: Status: RESOLVED | Noted: 2019-11-20 | Resolved: 2024-08-23

## 2024-08-23 PROBLEM — Z87.2 HISTORY OF TELOGEN EFFLUVIUM: Status: RESOLVED | Noted: 2021-06-11 | Resolved: 2024-08-23

## 2024-08-23 PROBLEM — Z87.898 HISTORY OF DYSURIA: Status: RESOLVED | Noted: 2022-09-01 | Resolved: 2024-08-23

## 2024-08-23 PROBLEM — M25.512 LEFT SHOULDER PAIN: Status: RESOLVED | Noted: 2024-08-22 | Resolved: 2024-08-23
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
## 2024-08-23 PROBLEM — Z87.898 HISTORY OF FEVER: Status: RESOLVED | Noted: 2024-03-27 | Resolved: 2024-08-23

## 2024-08-23 PROBLEM — R14.2 BURPING: Status: RESOLVED | Noted: 2019-12-05 | Resolved: 2024-08-23

## 2024-08-23 PROBLEM — L50.1 CHRONIC IDIOPATHIC URTICARIA: Status: RESOLVED | Noted: 2019-11-20 | Resolved: 2024-08-23

## 2024-08-23 PROBLEM — Z87.2 HISTORY OF URTICARIA: Status: RESOLVED | Noted: 2019-12-05 | Resolved: 2024-08-23

## 2024-08-23 PROBLEM — Z87.898 HISTORY OF WEAKNESS: Status: RESOLVED | Noted: 2019-04-01 | Resolved: 2024-08-23

## 2024-08-23 PROBLEM — Z87.2 HISTORY OF SKIN PRURITUS: Status: RESOLVED | Noted: 2019-04-01 | Resolved: 2024-08-23

## 2024-08-23 PROBLEM — M24.271 LIGAMENTOUS LAXITY OF RIGHT ANKLE: Status: RESOLVED | Noted: 2019-11-20 | Resolved: 2024-08-23

## 2024-08-26 ENCOUNTER — APPOINTMENT (OUTPATIENT)
Dept: HEART AND VASCULAR | Facility: CLINIC | Age: 39
End: 2024-08-26
Payer: COMMERCIAL

## 2024-08-26 VITALS
OXYGEN SATURATION: 99 % | WEIGHT: 152 LBS | HEART RATE: 68 BPM | BODY MASS INDEX: 24.43 KG/M2 | DIASTOLIC BLOOD PRESSURE: 74 MMHG | SYSTOLIC BLOOD PRESSURE: 109 MMHG | HEIGHT: 66 IN

## 2024-08-26 DIAGNOSIS — G89.29 LOW BACK PAIN, UNSPECIFIED: ICD-10-CM

## 2024-08-26 DIAGNOSIS — Z13.21 ENCOUNTER FOR SCREENING FOR NUTRITIONAL DISORDER: ICD-10-CM

## 2024-08-26 DIAGNOSIS — Z86.2 PERSONAL HISTORY OF DISEASES OF THE BLOOD AND BLOOD-FORMING ORGANS AND CERTAIN DISORDERS INVOLVING THE IMMUNE MECHANISM: ICD-10-CM

## 2024-08-26 DIAGNOSIS — R94.6 ABNORMAL RESULTS OF THYROID FUNCTION STUDIES: ICD-10-CM

## 2024-08-26 DIAGNOSIS — Z87.828 PERSONAL HISTORY OF OTHER (HEALED) PHYSICAL INJURY AND TRAUMA: ICD-10-CM

## 2024-08-26 DIAGNOSIS — R14.2 ERUCTATION: ICD-10-CM

## 2024-08-26 DIAGNOSIS — M25.572 PAIN IN LEFT ANKLE AND JOINTS OF LEFT FOOT: ICD-10-CM

## 2024-08-26 DIAGNOSIS — R52 OTHER COMPLICATIONS OF THE PUERPERIUM, NOT ELSEWHERE CLASSIFIED: ICD-10-CM

## 2024-08-26 DIAGNOSIS — M25.541 PAIN IN JOINTS OF RIGHT HAND: ICD-10-CM

## 2024-08-26 DIAGNOSIS — S93.492A SPRAIN OF OTHER LIGAMENT OF LEFT ANKLE, INITIAL ENCOUNTER: ICD-10-CM

## 2024-08-26 DIAGNOSIS — G56.01 CARPAL TUNNEL SYNDROME, RIGHT UPPER LIMB: ICD-10-CM

## 2024-08-26 DIAGNOSIS — R00.2 PALPITATIONS: ICD-10-CM

## 2024-08-26 DIAGNOSIS — Z86.018 PERSONAL HISTORY OF OTHER BENIGN NEOPLASM: ICD-10-CM

## 2024-08-26 DIAGNOSIS — Z87.2 PERSONAL HISTORY OF DISEASES OF THE SKIN AND SUBCUTANEOUS TISSUE: ICD-10-CM

## 2024-08-26 DIAGNOSIS — M79.642 PAIN IN LEFT HAND: ICD-10-CM

## 2024-08-26 DIAGNOSIS — R27.8 OTHER LACK OF COORDINATION: ICD-10-CM

## 2024-08-26 DIAGNOSIS — J35.1 HYPERTROPHY OF TONSILS: ICD-10-CM

## 2024-08-26 DIAGNOSIS — E78.5 HYPERLIPIDEMIA, UNSPECIFIED: ICD-10-CM

## 2024-08-26 DIAGNOSIS — L50.1 IDIOPATHIC URTICARIA: ICD-10-CM

## 2024-08-26 DIAGNOSIS — R79.82 ELEVATED C-REACTIVE PROTEIN (CRP): ICD-10-CM

## 2024-08-26 DIAGNOSIS — R59.1 GENERALIZED ENLARGED LYMPH NODES: ICD-10-CM

## 2024-08-26 DIAGNOSIS — M25.542 PAIN IN JOINTS OF RIGHT HAND: ICD-10-CM

## 2024-08-26 DIAGNOSIS — R82.90 UNSPECIFIED ABNORMAL FINDINGS IN URINE: ICD-10-CM

## 2024-08-26 DIAGNOSIS — M54.50 LOW BACK PAIN, UNSPECIFIED: ICD-10-CM

## 2024-08-26 DIAGNOSIS — Z87.898 PERSONAL HISTORY OF OTHER SPECIFIED CONDITIONS: ICD-10-CM

## 2024-08-26 DIAGNOSIS — Z13.29 ENCOUNTER FOR SCREENING FOR OTHER SUSPECTED ENDOCRINE DISORDER: ICD-10-CM

## 2024-08-26 DIAGNOSIS — Z13.1 ENCOUNTER FOR SCREENING FOR DIABETES MELLITUS: ICD-10-CM

## 2024-08-26 DIAGNOSIS — G89.29 PAIN IN UNSPECIFIED HAND: ICD-10-CM

## 2024-08-26 DIAGNOSIS — M25.641 STIFFNESS OF RIGHT HAND, NOT ELSEWHERE CLASSIFIED: ICD-10-CM
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
## 2024-08-26 DIAGNOSIS — M79.641 PAIN IN RIGHT HAND: ICD-10-CM

## 2024-08-26 DIAGNOSIS — Z13.220 ENCOUNTER FOR SCREENING FOR LIPOID DISORDERS: ICD-10-CM

## 2024-08-26 DIAGNOSIS — H04.123 DRY EYE SYNDROME OF BILATERAL LACRIMAL GLANDS: ICD-10-CM

## 2024-08-26 DIAGNOSIS — N64.4 MASTODYNIA: ICD-10-CM

## 2024-08-26 DIAGNOSIS — M24.271 DISORDER OF LIGAMENT, RIGHT ANKLE: ICD-10-CM

## 2024-08-26 DIAGNOSIS — M79.89 OTHER SPECIFIED SOFT TISSUE DISORDERS: ICD-10-CM

## 2024-08-26 DIAGNOSIS — S93.412A SPRAIN OF CALCANEOFIBULAR LIGAMENT OF LEFT ANKLE, INITIAL ENCOUNTER: ICD-10-CM

## 2024-08-26 DIAGNOSIS — Z01.00 ENCOUNTER FOR EXAMINATION OF EYES AND VISION W/OUT ABNORMAL FINDINGS: ICD-10-CM

## 2024-08-26 DIAGNOSIS — R29.898 OTHER SYMPTOMS AND SIGNS INVOLVING THE MUSCULOSKELETAL SYSTEM: ICD-10-CM

## 2024-08-26 DIAGNOSIS — Z12.83 ENCOUNTER FOR SCREENING FOR MALIGNANT NEOPLASM OF SKIN: ICD-10-CM

## 2024-08-26 DIAGNOSIS — H93.8X9 OTHER SPECIFIED DISORDERS OF EAR, UNSPECIFIED EAR: ICD-10-CM

## 2024-08-26 DIAGNOSIS — M24.272 DISORDER OF LIGAMENT, LEFT ANKLE: ICD-10-CM

## 2024-08-26 DIAGNOSIS — R21 RASH AND OTHER NONSPECIFIC SKIN ERUPTION: ICD-10-CM

## 2024-08-26 DIAGNOSIS — M25.512 PAIN IN LEFT SHOULDER: ICD-10-CM

## 2024-08-26 DIAGNOSIS — R59.0 LOCALIZED ENLARGED LYMPH NODES: ICD-10-CM

## 2024-08-26 DIAGNOSIS — Z88.9 ALLERGY STATUS TO UNSPECIFIED DRUGS, MEDICAMENTS AND BIOLOGICAL SUBSTANCES: ICD-10-CM

## 2024-08-26 DIAGNOSIS — M25.552 PAIN IN LEFT HIP: ICD-10-CM

## 2024-08-26 DIAGNOSIS — G89.29 PAIN IN LEFT HAND: ICD-10-CM

## 2024-08-26 DIAGNOSIS — E78.1 PURE HYPERGLYCERIDEMIA: ICD-10-CM

## 2024-08-26 DIAGNOSIS — M25.372 OTHER INSTABILITY, LEFT ANKLE: ICD-10-CM

## 2024-08-26 DIAGNOSIS — R55 SYNCOPE AND COLLAPSE: ICD-10-CM

## 2024-08-26 DIAGNOSIS — G89.29 PAIN IN RIGHT HAND: ICD-10-CM

## 2024-08-26 DIAGNOSIS — M79.643 PAIN IN UNSPECIFIED HAND: ICD-10-CM

## 2024-08-26 PROCEDURE — G2211 COMPLEX E/M VISIT ADD ON: CPT

## 2024-08-26 PROCEDURE — 99204 OFFICE O/P NEW MOD 45 MIN: CPT

## 2024-08-26 NOTE — HISTORY OF PRESENT ILLNESS
[FreeTextEntry1] : Ms. Sellers presents for initial evaluation and management of dyslipidemia, syncope, and anemia. She was evaluated in the ED at [facility] on 08/17/24 with complaints of palpitations. She had a benign workup and was diagnosed with a viral pharyngitis. She describes the symptoms as a sensation of forceful beats lasting minutes at a time.  The symptoms occur 1-2 times per week.  There are no inciting or remitting factors.  She denies chest pain and has VAZQUEZ to several flights of stairs. She has a 15-month-old daughter and just weaned her from breast milk.

## 2024-08-26 NOTE — ASSESSMENT
[FreeTextEntry1] : ======================================================================================= 1. Dyslipidemia:   (08/21/24):   - discussed therapeutic lifestyle changes to promote improved lipid metabolism   - recheck lipid profile in the future   2. Palpitations: ? non-cardiac vs. PVCs:    - will send for an echocardiogram to rule out structural heart disease   - will order a 2-week extended Holter (BardyDx) to rule out a sustained dysrhythmia

## 2024-08-26 NOTE — REASON FOR VISIT
[Other: ____] : [unfilled] [FreeTextEntry1] : ======================================================================= Diagnostic Tests: -------------------------------------------------------------- EC24: sinus rhythm, normal ECG.  -------------------------------------------------------------- Sleep studies: 19: HST: normal

## 2024-08-26 NOTE — HISTORY OF PRESENT ILLNESS
[FreeTextEntry1] : Ms. Sellers presents for initial evaluation and management of dyslipidemia, syncope, and anemia. She was evaluated in the ED at [facility] on 08/17/24 with complaints of palpitations. She had a benign workup and was diagnosed with a viral pharyngitis. She describes the symptoms as a sensation of forceful beats lasting minutes at a time.  The symptoms occur 1-2 times per week.  There are no inciting or remitting factors.  She denies chest pain and has VAZQUEZ to several flights of stairs. She has a 15-month-old daughter and just weaned her from breast milk.

## 2024-09-23 ENCOUNTER — APPOINTMENT (OUTPATIENT)
Dept: OPHTHALMOLOGY | Facility: CLINIC | Age: 39
End: 2024-09-23
Payer: SELF-PAY

## 2024-09-23 ENCOUNTER — NON-APPOINTMENT (OUTPATIENT)
Age: 39
End: 2024-09-23

## 2024-09-23 ENCOUNTER — APPOINTMENT (OUTPATIENT)
Dept: OPHTHALMOLOGY | Facility: CLINIC | Age: 39
End: 2024-09-23
Payer: COMMERCIAL

## 2024-09-23 PROCEDURE — 92004 COMPRE OPH EXAM NEW PT 1/>: CPT

## 2024-09-23 PROCEDURE — 92015 DETERMINE REFRACTIVE STATE: CPT

## 2024-09-30 ENCOUNTER — NON-APPOINTMENT (OUTPATIENT)
Age: 39
End: 2024-09-30

## 2024-09-30 ENCOUNTER — APPOINTMENT (OUTPATIENT)
Dept: OTOLARYNGOLOGY | Facility: CLINIC | Age: 39
End: 2024-09-30
Payer: COMMERCIAL

## 2024-09-30 VITALS
DIASTOLIC BLOOD PRESSURE: 77 MMHG | HEIGHT: 66 IN | SYSTOLIC BLOOD PRESSURE: 119 MMHG | TEMPERATURE: 98.6 F | HEART RATE: 72 BPM | BODY MASS INDEX: 24.43 KG/M2 | OXYGEN SATURATION: 99 % | WEIGHT: 152 LBS

## 2024-09-30 DIAGNOSIS — J35.1 HYPERTROPHY OF TONSILS: ICD-10-CM

## 2024-09-30 DIAGNOSIS — E04.1 NONTOXIC SINGLE THYROID NODULE: ICD-10-CM

## 2024-09-30 DIAGNOSIS — J30.1 ALLERGIC RHINITIS DUE TO POLLEN: ICD-10-CM

## 2024-09-30 DIAGNOSIS — R06.83 SNORING: ICD-10-CM

## 2024-09-30 PROCEDURE — 99204 OFFICE O/P NEW MOD 45 MIN: CPT

## 2024-09-30 PROCEDURE — G2211 COMPLEX E/M VISIT ADD ON: CPT

## 2024-09-30 NOTE — HISTORY OF PRESENT ILLNESS
[de-identified] : She had pharyngitis a month ago; she went to the ED where she wasn't given abx.  Snoring but no witnessed apneas; not treated for HTN. No morning headaches but a lot of daytime somnolence.  Also with L ear "fuzziness" kervin in loud areas; this is rare now. No hearing loss but rarely she has nonpulsatile R>L tinnitus.  The patient denies a family history of thyroid disorders/cancers and has had no ionizing radiation exposure.

## 2024-09-30 NOTE — DATA REVIEWED
[de-identified] : 5/19: normal [de-identified] : 6/24: jah PRESLEY [de-identified] : 5/19 neck: R thyroid lobe 1 cm nodule w/o susp features; 2.4 x 1.1 x 1.7cm R level 2 LN w/ a fatty hilum 12/22: two R side cysts [de-identified] : 9/19 HSAT: pAHI 0.5, LSat 85%

## 2024-09-30 NOTE — ASSESSMENT
[FreeTextEntry1] : We discussed her daytime sleepiness and large tonsils in light of a negative prior HSAT and no witnessed apneas; we decided against tonsillectomy.   I reassured her about her thyroid cysts which don't require surveillance.

## 2024-09-30 NOTE — CONSULT LETTER
[Dear  ___] : Dear  [unfilled], [Consult Letter:] : I had the pleasure of evaluating your patient, [unfilled]. [Please see my note below.] : Please see my note below. [Consult Closing:] : Thank you very much for allowing me to participate in the care of this patient.  If you have any questions, please do not hesitate to contact me. [Sincerely,] : Sincerely, [FreeTextEntry3] : ROBY Marrero Jr, MD, FAAOHNS Otolaryngologist [facility]

## 2024-10-03 ENCOUNTER — APPOINTMENT (OUTPATIENT)
Dept: ENDOCRINOLOGY | Facility: CLINIC | Age: 39
End: 2024-10-03

## 2024-12-02 ENCOUNTER — TRANSCRIPTION ENCOUNTER (OUTPATIENT)
Age: 39
End: 2024-12-02

## 2024-12-02 ENCOUNTER — RX RENEWAL (OUTPATIENT)
Age: 39
End: 2024-12-02

## 2024-12-19 ENCOUNTER — APPOINTMENT (OUTPATIENT)
Dept: HEART AND VASCULAR | Facility: CLINIC | Age: 39
End: 2024-12-19

## 2025-03-05 ENCOUNTER — RX RENEWAL (OUTPATIENT)
Age: 40
End: 2025-03-05

## 2025-04-14 ENCOUNTER — NON-APPOINTMENT (OUTPATIENT)
Age: 40
End: 2025-04-14

## 2025-04-24 ENCOUNTER — NON-APPOINTMENT (OUTPATIENT)
Age: 40
End: 2025-04-24

## 2025-04-24 ENCOUNTER — APPOINTMENT (OUTPATIENT)
Dept: FAMILY MEDICINE | Facility: CLINIC | Age: 40
End: 2025-04-24
Payer: COMMERCIAL

## 2025-04-24 VITALS
BODY MASS INDEX: 27 KG/M2 | TEMPERATURE: 97.8 F | DIASTOLIC BLOOD PRESSURE: 77 MMHG | SYSTOLIC BLOOD PRESSURE: 112 MMHG | OXYGEN SATURATION: 99 % | HEIGHT: 66 IN | HEART RATE: 73 BPM | WEIGHT: 168 LBS

## 2025-04-24 DIAGNOSIS — R00.2 PALPITATIONS: ICD-10-CM

## 2025-04-24 DIAGNOSIS — R42 DIZZINESS AND GIDDINESS: ICD-10-CM

## 2025-04-24 DIAGNOSIS — L65.9 NONSCARRING HAIR LOSS, UNSPECIFIED: ICD-10-CM

## 2025-04-24 DIAGNOSIS — Z00.00 ENCOUNTER FOR GENERAL ADULT MEDICAL EXAMINATION W/OUT ABNORMAL FINDINGS: ICD-10-CM

## 2025-04-24 DIAGNOSIS — Z71.85 ENCOUNTER FOR IMMUNIZATION SAFETY COUNSELING: ICD-10-CM

## 2025-04-24 DIAGNOSIS — R92.30 DENSE BREASTS, UNSPECIFIED: ICD-10-CM

## 2025-04-24 PROCEDURE — 99396 PREV VISIT EST AGE 40-64: CPT

## 2025-04-24 PROCEDURE — 99213 OFFICE O/P EST LOW 20 MIN: CPT | Mod: 25

## 2025-04-24 PROCEDURE — 93000 ELECTROCARDIOGRAM COMPLETE: CPT

## 2025-04-28 ENCOUNTER — OUTPATIENT (OUTPATIENT)
Dept: OUTPATIENT SERVICES | Facility: HOSPITAL | Age: 40
LOS: 1 days | End: 2025-04-28

## 2025-04-28 ENCOUNTER — RESULT REVIEW (OUTPATIENT)
Age: 40
End: 2025-04-28

## 2025-04-28 ENCOUNTER — APPOINTMENT (OUTPATIENT)
Dept: MAMMOGRAPHY | Facility: CLINIC | Age: 40
End: 2025-04-28
Payer: COMMERCIAL

## 2025-04-28 ENCOUNTER — APPOINTMENT (OUTPATIENT)
Dept: ULTRASOUND IMAGING | Facility: CLINIC | Age: 40
End: 2025-04-28
Payer: COMMERCIAL

## 2025-04-28 LAB
25(OH)D3 SERPL-MCNC: 44.6 NG/ML
ALBUMIN SERPL ELPH-MCNC: 4 G/DL
ALP BLD-CCNC: 55 U/L
ALT SERPL-CCNC: 11 U/L
ANION GAP SERPL CALC-SCNC: 12 MMOL/L
APPEARANCE: CLEAR
AST SERPL-CCNC: 15 U/L
BACTERIA: NEGATIVE /HPF
BASOPHILS # BLD AUTO: 0.05 K/UL
BASOPHILS NFR BLD AUTO: 0.6 %
BILIRUB SERPL-MCNC: 0.5 MG/DL
BILIRUBIN URINE: NEGATIVE
BLOOD URINE: NEGATIVE
BUN SERPL-MCNC: 13 MG/DL
CALCIUM SERPL-MCNC: 9 MG/DL
CAST: 0 /LPF
CHLORIDE SERPL-SCNC: 106 MMOL/L
CHOLEST SERPL-MCNC: 196 MG/DL
CO2 SERPL-SCNC: 22 MMOL/L
COLOR: YELLOW
CREAT SERPL-MCNC: 0.89 MG/DL
EGFRCR SERPLBLD CKD-EPI 2021: 83 ML/MIN/1.73M2
EOSINOPHIL # BLD AUTO: 0.3 K/UL
EOSINOPHIL NFR BLD AUTO: 3.6 %
EPITHELIAL CELLS: 9 /HPF
ESTIMATED AVERAGE GLUCOSE: 111 MG/DL
FERRITIN SERPL-MCNC: 30 NG/ML
FOLATE SERPL-MCNC: 16 NG/ML
GLUCOSE QUALITATIVE U: NEGATIVE MG/DL
GLUCOSE SERPL-MCNC: 98 MG/DL
HBA1C MFR BLD HPLC: 5.5 %
HCT VFR BLD CALC: 41.4 %
HDLC SERPL-MCNC: 54 MG/DL
HGB BLD-MCNC: 12.8 G/DL
IMM GRANULOCYTES NFR BLD AUTO: 0.2 %
IRON SATN MFR SERPL: 25 %
IRON SERPL-MCNC: 80 UG/DL
KETONES URINE: NEGATIVE MG/DL
LDLC SERPL-MCNC: 124 MG/DL
LEUKOCYTE ESTERASE URINE: NEGATIVE
LYMPHOCYTES # BLD AUTO: 3.57 K/UL
LYMPHOCYTES NFR BLD AUTO: 42.4 %
MAN DIFF?: NORMAL
MCHC RBC-ENTMCNC: 30 PG
MCHC RBC-ENTMCNC: 30.9 G/DL
MCV RBC AUTO: 97.2 FL
MEV IGG FLD QL IA: 117 AU/ML
MEV IGG+IGM SER-IMP: POSITIVE
MICROSCOPIC-UA: NORMAL
MONOCYTES # BLD AUTO: 0.51 K/UL
MONOCYTES NFR BLD AUTO: 6.1 %
MUV AB SER-ACNC: POSITIVE
MUV IGG SER QL IA: 32.8 AU/ML
NEUTROPHILS # BLD AUTO: 3.96 K/UL
NEUTROPHILS NFR BLD AUTO: 47.1 %
NITRITE URINE: NEGATIVE
NONHDLC SERPL-MCNC: 143 MG/DL
PH URINE: 6
PLATELET # BLD AUTO: 351 K/UL
POTASSIUM SERPL-SCNC: 4.9 MMOL/L
PROT SERPL-MCNC: 7.1 G/DL
PROTEIN URINE: NEGATIVE MG/DL
RBC # BLD: 4.26 M/UL
RBC # FLD: 12.4 %
RED BLOOD CELLS URINE: 4 /HPF
RUBV IGG FLD-ACNC: 1.82 INDEX
RUBV IGG SER-IMP: POSITIVE
SODIUM SERPL-SCNC: 139 MMOL/L
SPECIFIC GRAVITY URINE: 1.02
T4 FREE SERPL-MCNC: 1.3 NG/DL
TIBC SERPL-MCNC: 316 UG/DL
TRANSFERRIN SERPL-MCNC: 236 MG/DL
TRIGL SERPL-MCNC: 101 MG/DL
TSH SERPL-ACNC: 0.96 UIU/ML
UIBC SERPL-MCNC: 236 UG/DL
UROBILINOGEN URINE: 0.2 MG/DL
VIT B12 SERPL-MCNC: 713 PG/ML
VZV AB TITR SER: POSITIVE
VZV IGG SER IF-ACNC: 5.49 S/CO
WBC # FLD AUTO: 8.41 K/UL
WHITE BLOOD CELLS URINE: 0 /HPF

## 2025-04-28 PROCEDURE — 77063 BREAST TOMOSYNTHESIS BI: CPT | Mod: 26

## 2025-04-28 PROCEDURE — 76641 ULTRASOUND BREAST COMPLETE: CPT | Mod: 26,50

## 2025-04-28 PROCEDURE — 77067 SCR MAMMO BI INCL CAD: CPT | Mod: 26

## 2025-05-08 ENCOUNTER — TRANSCRIPTION ENCOUNTER (OUTPATIENT)
Age: 40
End: 2025-05-08

## 2025-05-09 ENCOUNTER — TRANSCRIPTION ENCOUNTER (OUTPATIENT)
Age: 40
End: 2025-05-09

## 2025-05-13 ENCOUNTER — TRANSCRIPTION ENCOUNTER (OUTPATIENT)
Age: 40
End: 2025-05-13

## 2025-06-01 ENCOUNTER — EMERGENCY (EMERGENCY)
Facility: HOSPITAL | Age: 40
LOS: 1 days | End: 2025-06-01
Attending: EMERGENCY MEDICINE | Admitting: EMERGENCY MEDICINE
Payer: COMMERCIAL

## 2025-06-01 VITALS
HEART RATE: 78 BPM | RESPIRATION RATE: 16 BRPM | SYSTOLIC BLOOD PRESSURE: 126 MMHG | OXYGEN SATURATION: 98 % | HEIGHT: 66 IN | WEIGHT: 169.98 LBS | TEMPERATURE: 99 F | DIASTOLIC BLOOD PRESSURE: 88 MMHG

## 2025-06-01 DIAGNOSIS — R07.89 OTHER CHEST PAIN: ICD-10-CM

## 2025-06-01 DIAGNOSIS — R00.1 BRADYCARDIA, UNSPECIFIED: ICD-10-CM

## 2025-06-01 DIAGNOSIS — Z88.1 ALLERGY STATUS TO OTHER ANTIBIOTIC AGENTS: ICD-10-CM

## 2025-06-01 PROCEDURE — 99285 EMERGENCY DEPT VISIT HI MDM: CPT

## 2025-06-01 NOTE — ED ADULT NURSE NOTE - OBJECTIVE STATEMENT
Pt c/o upper left chest pain. Endorses shooting pain 5-6 times starting today. States pain has been resolving itself, concerned when it returns.  Denies radiating pain, numbness, tingling, Pt A&Ox4, speaking in full sentences.

## 2025-06-02 ENCOUNTER — TRANSCRIPTION ENCOUNTER (OUTPATIENT)
Age: 40
End: 2025-06-02

## 2025-06-02 VITALS
HEART RATE: 74 BPM | TEMPERATURE: 98 F | SYSTOLIC BLOOD PRESSURE: 123 MMHG | DIASTOLIC BLOOD PRESSURE: 84 MMHG | RESPIRATION RATE: 18 BRPM | OXYGEN SATURATION: 99 %

## 2025-06-02 LAB
ALBUMIN SERPL ELPH-MCNC: 3.4 G/DL — SIGNIFICANT CHANGE UP (ref 3.4–5)
ALP SERPL-CCNC: 54 U/L — SIGNIFICANT CHANGE UP (ref 40–120)
ALT FLD-CCNC: 21 U/L — SIGNIFICANT CHANGE UP (ref 12–42)
ANION GAP SERPL CALC-SCNC: 7 MMOL/L — LOW (ref 9–16)
AST SERPL-CCNC: 14 U/L — LOW (ref 15–37)
BASOPHILS # BLD AUTO: 0.06 K/UL — SIGNIFICANT CHANGE UP (ref 0–0.2)
BASOPHILS NFR BLD AUTO: 0.7 % — SIGNIFICANT CHANGE UP (ref 0–2)
BILIRUB SERPL-MCNC: 0.3 MG/DL — SIGNIFICANT CHANGE UP (ref 0.2–1.2)
BUN SERPL-MCNC: 18 MG/DL — SIGNIFICANT CHANGE UP (ref 7–23)
CALCIUM SERPL-MCNC: 8.5 MG/DL — SIGNIFICANT CHANGE UP (ref 8.5–10.5)
CHLORIDE SERPL-SCNC: 105 MMOL/L — SIGNIFICANT CHANGE UP (ref 96–108)
CO2 SERPL-SCNC: 28 MMOL/L — SIGNIFICANT CHANGE UP (ref 22–31)
CREAT SERPL-MCNC: 0.85 MG/DL — SIGNIFICANT CHANGE UP (ref 0.5–1.3)
D DIMER BLD IA.RAPID-MCNC: <187 NG/ML DDU — SIGNIFICANT CHANGE UP
EGFR: 88 ML/MIN/1.73M2 — SIGNIFICANT CHANGE UP
EGFR: 88 ML/MIN/1.73M2 — SIGNIFICANT CHANGE UP
EOSINOPHIL # BLD AUTO: 0.21 K/UL — SIGNIFICANT CHANGE UP (ref 0–0.5)
EOSINOPHIL NFR BLD AUTO: 2.5 % — SIGNIFICANT CHANGE UP (ref 0–6)
GLUCOSE SERPL-MCNC: 98 MG/DL — SIGNIFICANT CHANGE UP (ref 70–99)
HCG SERPL-ACNC: 2 MIU/ML — SIGNIFICANT CHANGE UP
HCT VFR BLD CALC: 35.6 % — SIGNIFICANT CHANGE UP (ref 34.5–45)
HGB BLD-MCNC: 11.7 G/DL — SIGNIFICANT CHANGE UP (ref 11.5–15.5)
IMM GRANULOCYTES # BLD AUTO: 0.01 K/UL — SIGNIFICANT CHANGE UP (ref 0–0.07)
IMM GRANULOCYTES NFR BLD AUTO: 0.1 % — SIGNIFICANT CHANGE UP (ref 0–0.9)
LYMPHOCYTES # BLD AUTO: 3.76 K/UL — HIGH (ref 1–3.3)
LYMPHOCYTES NFR BLD AUTO: 44.4 % — HIGH (ref 13–44)
MCHC RBC-ENTMCNC: 30.1 PG — SIGNIFICANT CHANGE UP (ref 27–34)
MCHC RBC-ENTMCNC: 32.9 G/DL — SIGNIFICANT CHANGE UP (ref 32–36)
MCV RBC AUTO: 91.5 FL — SIGNIFICANT CHANGE UP (ref 80–100)
MONOCYTES # BLD AUTO: 0.55 K/UL — SIGNIFICANT CHANGE UP (ref 0–0.9)
MONOCYTES NFR BLD AUTO: 6.5 % — SIGNIFICANT CHANGE UP (ref 2–14)
NEUTROPHILS # BLD AUTO: 3.87 K/UL — SIGNIFICANT CHANGE UP (ref 1.8–7.4)
NEUTROPHILS NFR BLD AUTO: 45.8 % — SIGNIFICANT CHANGE UP (ref 43–77)
NRBC # BLD AUTO: 0 K/UL — SIGNIFICANT CHANGE UP (ref 0–0)
NRBC # FLD: 0 K/UL — SIGNIFICANT CHANGE UP (ref 0–0)
NRBC BLD AUTO-RTO: 0 /100 WBCS — SIGNIFICANT CHANGE UP (ref 0–0)
NT-PROBNP SERPL-SCNC: 63 PG/ML — SIGNIFICANT CHANGE UP
PLATELET # BLD AUTO: 320 K/UL — SIGNIFICANT CHANGE UP (ref 150–400)
PMV BLD: 9 FL — SIGNIFICANT CHANGE UP (ref 7–13)
POTASSIUM SERPL-MCNC: 4 MMOL/L — SIGNIFICANT CHANGE UP (ref 3.5–5.3)
POTASSIUM SERPL-SCNC: 4 MMOL/L — SIGNIFICANT CHANGE UP (ref 3.5–5.3)
PROT SERPL-MCNC: 7 G/DL — SIGNIFICANT CHANGE UP (ref 6.4–8.2)
RBC # BLD: 3.89 M/UL — SIGNIFICANT CHANGE UP (ref 3.8–5.2)
RBC # FLD: 11.8 % — SIGNIFICANT CHANGE UP (ref 10.3–14.5)
SODIUM SERPL-SCNC: 140 MMOL/L — SIGNIFICANT CHANGE UP (ref 132–145)
TROPONIN I, HIGH SENSITIVITY RESULT: 9.7 NG/L — SIGNIFICANT CHANGE UP
WBC # BLD: 8.46 K/UL — SIGNIFICANT CHANGE UP (ref 3.8–10.5)
WBC # FLD AUTO: 8.46 K/UL — SIGNIFICANT CHANGE UP (ref 3.8–10.5)

## 2025-06-02 PROCEDURE — 71046 X-RAY EXAM CHEST 2 VIEWS: CPT | Mod: 26

## 2025-06-02 NOTE — ED PROVIDER NOTE - PATIENT PORTAL LINK FT
You can access the FollowMyHealth Patient Portal offered by [facility] by registering at the following website: [email or web address]. By joining [employer]’s FollowMyHealth portal, you will also be able to view your health information using other applications (apps) compatible with our system.

## 2025-06-02 NOTE — ED PROVIDER NOTE - OBJECTIVE STATEMENT
40-year-old female with no current medical issues presenting to the emergency room for intermittent episodes of chest pain in her upper chest.  States that has been occurring over the course of the day, last episode was at 10 PM.  Denies any shortness of breath.  No sweats, recent illnesses.  Has been on multiple flights recently, states that they have been short range flights.  Had a flight yesterday and the day before, and is also flying again tomorrow.

## 2025-06-02 NOTE — ED PROVIDER NOTE - PROGRESS NOTE DETAILS
Patient's labs are unremarkable, advised patient of results.  Recommended following up with primary care doctor and a cardiologist as an outpatient.  Return precautions discussed.  All questions answered, well-appearing upon discharge.

## 2025-06-02 NOTE — ED PROVIDER NOTE - CLINICAL SUMMARY MEDICAL DECISION MAKING FREE TEXT BOX
A/P: 40-year-old female with no current medical issues presenting to the emergency room for intermittent episodes of chest pain in her upper chest.  States that has been occurring over the course of the day, last episode was at 10 PM.  Denies any shortness of breath.  No sweats, recent illnesses.  Has been on multiple flights recently, states that they have been short range flights.  Had a flight yesterday and the day before, and is also flying again tomorrow.  --Differential includes but not limited to costochondritis, musculoskeletal chest pain, ACS, MI, PE, pneumothorax  -- Plan to check lab work, chest x-ray

## 2025-06-02 NOTE — ED PROVIDER NOTE - NSFOLLOWUPINSTRUCTIONS_ED_ALL_ED_FT
Nonspecific Chest Pain, Adult  Chest pain is an uncomfortable, tight, or painful feeling in the chest. The pain can feel like a crushing, aching, or squeezing pressure. A person can feel a burning or tingling sensation. Chest pain can also be felt in your back, neck, jaw, shoulder, or arm. This pain can be worse when you move, sneeze, or take a deep breath.    Chest pain can be caused by a condition that is life-threatening. This must be treated right away. It can also be caused by something that is not life-threatening. If you have chest pain, it can be hard to know the difference, so it is important to get help right away to make sure that you do not have a serious condition.    Some life-threatening causes of chest pain include:  Heart attack.  A tear in the body's main blood vessel (aortic dissection).  Inflammation around your heart (pericarditis).  A problem in the lungs, such as a blood clot (pulmonary embolism) or a collapsed lung (pneumothorax).  Some non life-threatening causes of chest pain include:  Heartburn.  Anxiety or stress.  Damage to the bones, muscles, and cartilage that make up your chest wall.  Pneumonia or bronchitis.  Shingles infection (varicella-zoster virus).  Your chest pain may come and go. It may also be constant. Your health care provider will do tests and other studies to find the cause of your pain. Treatment will depend on the cause of your chest pain.    Follow these instructions at home:  Medicines    Take over-the-counter and prescription medicines only as told by your health care provider.  If you were prescribed an antibiotic medicine, take it as told by your health care provider. Do not stop taking the antibiotic even if you start to feel better.  Activity    Avoid any activities that cause chest pain.  Do not lift anything that is heavier than 10 lb (4.5 kg), or the limit that you are told, until your health care provider says that it is safe.  Rest as directed by your health care provider.  Return to your normal activities only as told by your health care provider. Ask your health care provider what activities are safe for you.  Lifestyle    A plate along with examples of foods in a healthy diet.  Silhouette of a person sitting on the floor doing yoga.  Do not use any products that contain nicotine or tobacco, such as cigarettes, e-cigarettes, and chewing tobacco. If you need help quitting, ask your health care provider.  Do not drink alcohol.  Make healthy lifestyle changes as recommended. These may include:  Getting regular exercise. Ask your health care provider to suggest some exercises that are safe for you.  Eating a heart-healthy diet. This includes plenty of fresh fruits and vegetables, whole grains, low-fat (lean) protein, and low-fat dairy products. A dietitian can help you find healthy eating options.  Maintaining a healthy weight.  Managing any other health conditions you may have, such as high blood pressure (hypertension) or diabetes.  Reducing stress, such as with yoga or relaxation techniques.  General instructions    Pay attention to any changes in your symptoms.  It is up to you to get the results of any tests that were done. Ask your health care provider, or the department that is doing the tests, when your results will be ready.  Keep all follow-up visits as told by your health care provider. This is important.  You may be asked to go for further testing if your chest pain does not go away.  Contact a health care provider if:  Your chest pain does not go away.  You feel depressed.  You have a fever.  You notice changes in your symptoms or develop new symptoms.  Get help right away if:  Your chest pain gets worse.  You have a cough that gets worse, or you cough up blood.  You have severe pain in your abdomen.  You faint.  You have sudden, unexplained chest discomfort.  You have sudden, unexplained discomfort in your arms, back, neck, or jaw.  You have shortness of breath at any time.  You suddenly start to sweat, or your skin gets clammy.  You feel nausea or you vomit.  You suddenly feel lightheaded or dizzy.  You have severe weakness, or unexplained weakness or fatigue.  Your heart begins to beat quickly, or it feels like it is skipping beats.  These symptoms may represent a serious problem that is an emergency. Do not wait to see if the symptoms will go away. Get medical help right away. Call your local emergency services (911 in the [place] Do not drive yourself to the hospital.    Summary  Chest pain can be caused by a condition that is serious and requires urgent treatment. It may also be caused by something that is not life-threatening.  Your health care provider may do lab tests and other studies to find the cause of your pain.  Follow your health care provider's instructions on taking medicines, making lifestyle changes, and getting emergency treatment if symptoms become worse.  Keep all follow-up visits as told by your health care provider. This includes visits for any further testing if your chest pain does not go away.  This information is not intended to replace advice given to you by your health care provider. Make sure you discuss any questions you have with your health care provider.    Document Revised: 11/02/2023 Document Reviewed: 11/02/2023  Elsevier Patient Education © 2025 [employer]

## 2025-06-03 ENCOUNTER — RX RENEWAL (OUTPATIENT)
Age: 40
End: 2025-06-03

## 2025-08-06 ENCOUNTER — APPOINTMENT (OUTPATIENT)
Dept: GASTROENTEROLOGY | Facility: CLINIC | Age: 40
End: 2025-08-06